# Patient Record
Sex: FEMALE | Race: WHITE
[De-identification: names, ages, dates, MRNs, and addresses within clinical notes are randomized per-mention and may not be internally consistent; named-entity substitution may affect disease eponyms.]

---

## 2017-12-16 ENCOUNTER — HOSPITAL ENCOUNTER (EMERGENCY)
Dept: HOSPITAL 45 - C.EDB | Age: 37
Discharge: HOME | End: 2017-12-16
Payer: COMMERCIAL

## 2017-12-16 VITALS — DIASTOLIC BLOOD PRESSURE: 100 MMHG | SYSTOLIC BLOOD PRESSURE: 149 MMHG | OXYGEN SATURATION: 100 % | HEART RATE: 75 BPM

## 2017-12-16 VITALS
HEIGHT: 62.01 IN | BODY MASS INDEX: 49.55 KG/M2 | BODY MASS INDEX: 49.55 KG/M2 | WEIGHT: 272.71 LBS | WEIGHT: 272.71 LBS | HEIGHT: 62.01 IN

## 2017-12-16 VITALS — TEMPERATURE: 98.24 F

## 2017-12-16 DIAGNOSIS — F32.9: ICD-10-CM

## 2017-12-16 DIAGNOSIS — M54.6: Primary | ICD-10-CM

## 2017-12-16 DIAGNOSIS — I10: ICD-10-CM

## 2017-12-16 DIAGNOSIS — Z79.1: ICD-10-CM

## 2017-12-16 DIAGNOSIS — Z79.891: ICD-10-CM

## 2017-12-16 DIAGNOSIS — E03.9: ICD-10-CM

## 2017-12-16 DIAGNOSIS — M54.5: ICD-10-CM

## 2017-12-16 DIAGNOSIS — K58.9: ICD-10-CM

## 2017-12-16 LAB
ALP SERPL-CCNC: 108 U/L (ref 45–117)
ALT SERPL-CCNC: 24 U/L (ref 12–78)
ANION GAP SERPL CALC-SCNC: 5 MMOL/L (ref 3–11)
APPEARANCE UR: CLEAR
AST SERPL-CCNC: 16 U/L (ref 15–37)
BASOPHILS # BLD: 0.02 K/UL (ref 0–0.2)
BASOPHILS NFR BLD: 0.2 %
BILIRUB UR-MCNC: (no result) MG/DL
BUN SERPL-MCNC: 17 MG/DL (ref 7–18)
BUN/CREAT SERPL: 20.5 (ref 10–20)
CALCIUM SERPL-MCNC: 9.6 MG/DL (ref 8.5–10.1)
CHLORIDE SERPL-SCNC: 103 MMOL/L (ref 98–107)
CO2 SERPL-SCNC: 29 MMOL/L (ref 21–32)
COLOR UR: YELLOW
COMPLETE: YES
CREAT CL PREDICTED SERPL C-G-VRATE: 112.7 ML/MIN
CREAT SERPL-MCNC: 0.85 MG/DL (ref 0.6–1.2)
EOSINOPHIL NFR BLD AUTO: 303 K/UL (ref 130–400)
GLUCOSE SERPL-MCNC: 98 MG/DL (ref 70–99)
HCT VFR BLD CALC: 40.5 % (ref 37–47)
IG%: 0.1 %
IMM GRANULOCYTES NFR BLD AUTO: 28.6 %
LYMPHOCYTES # BLD: 2.77 K/UL (ref 1.2–3.4)
MANUAL MICROSCOPIC REQUIRED?: NO
MCH RBC QN AUTO: 28.8 PG (ref 25–34)
MCHC RBC AUTO-ENTMCNC: 33.6 G/DL (ref 32–36)
MCV RBC AUTO: 85.6 FL (ref 80–100)
MONOCYTES NFR BLD: 4.3 %
NEUTROPHILS # BLD AUTO: 2.8 %
NEUTROPHILS NFR BLD AUTO: 64 %
NITRITE UR QL STRIP: (no result)
PH UR STRIP: 5 [PH] (ref 4.5–7.5)
PMV BLD AUTO: 10.1 FL (ref 7.4–10.4)
POTASSIUM SERPL-SCNC: 3.6 MMOL/L (ref 3.5–5.1)
RBC # BLD AUTO: 4.73 M/UL (ref 4.2–5.4)
REVIEW REQ?: NO
SODIUM SERPL-SCNC: 137 MMOL/L (ref 136–145)
SP GR UR STRIP: 1.03 (ref 1–1.03)
URINE EPITHELIAL CELL AUTO: >30 /LPF (ref 0–5)
UROBILINOGEN UR-MCNC: (no result) MG/DL
WBC # BLD AUTO: 9.67 K/UL (ref 4.8–10.8)
ZZUR CULT IF INDIC CLEAN CATCH: YES

## 2017-12-16 NOTE — DIAGNOSTIC IMAGING REPORT
THORACIC SPINE CT



CT DOSE: 1375.35 mGy.cm



HISTORY:      Lower back pain.



TECHNIQUE: Multiaxial CT images of the thoracic spine were performed and

reformatted in the sagittal and coronal plane without the use of contrast.  A

dose lowering technique was utilized adhering to the principles of ALARA.



COMPARISON:  None.



FINDINGS: No fractures of dictation within the thoracic spine. Mild disc space

narrowing throughout the thoracic spine. Endplate osteophytes within the lower

thoracic spine. Mild right-sided facet degenerative changes within the mid

thoracic spine. No significant central canal or neural foraminal narrowing by CT

technique. Minimal dextroscoliosis of the midthoracic spine. No pneumothorax.

Paraspinal soft tissues are unremarkable.



IMPRESSION:  

No fractures within the thoracic spine.







Electronically signed by:  Mathieu Garcia M.D.

12/16/2017 5:16 PM



Dictated Date/Time:  12/16/2017 5:12 PM

## 2017-12-16 NOTE — EMERGENCY ROOM VISIT NOTE
History


Report prepared by U[place]:  Rene Skaggs


Under the Supervision of:  Dr. Octavio Aceves D.O.


First contact with patient:  16:01


Chief Complaint:  BACK PAIN


Stated Complaint:  SEVERE MID BACK PAIN/LOWER BACK PAIN





History of Present Illness


The patient is a 37 year old female who presents to the Emergency Room with 

complaints of constant mid back pain for the past three weeks which she 

describes as a shooting stabbing pain. She notes that the pain takes her breath 

away. Twisting, turning, bending, and general movement makes the pain worse, 

and the pain wraps around her sides. The patient notes that her last bowel 

movement was yesterday, she is nauseous, and she states that she is able to 

walk. She reports that she has a history of lower back issues, though this is 

different. Pt denies headache, change in vision, fevers, chest pain, nausea, 

vomiting, diarrhea, pain with urination, melena, weakness, numbness, recent 

trauma, problems with her bowels or urination, history of cancer, IV drug use. 

and any recent surgeries. 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





   Source of History:  patient


   Onset:  three weeks ago


   Position:  back (middle)


   Quality:  stabbing


   Timing:  constant


   Modifying Factors (Worsening):  movement, other (twisting, turning, bending)


   Associated Symptoms:  + nausea





Review of Systems


See HPI for pertinent positives & negatives. A total of 10 systems reviewed and 

were otherwise negative.





Past Medical & Surgical


Medical Problems:


(1) Anxiety


(2) Constipation


(3) Depression


(4) Hypertension


(5) Hypothyroidism


(6) IBS (irritable bowel syndrome)








Family History





Patient reports no known family medical history.





Social History


Smoking Status:  Never Smoker


Alcohol Use:  none


Marital Status:  in relationship


Housing Status:  lives with family


Occupation Status:  employed





Current/Historical Medications


Scheduled


Celecoxib (Celecoxib), 200 MG PO BID


Hydrocodone/Acetaminophen 5MG/325MG (Norco 5MG/325MG), 1 TABLET PO HS


Hydrocodone/Acetaminophen 5MG/325MG (Norco 5MG/325MG), 2 TABLETS PO QAM


Levothyroxine Sodium (Levothyroxine Sodium), 200 MCG PO QAM


Lisinopril (Lisinopril), 2.5 MG PO DAILY


Milnacipran Hcl (Savella), 50 MG PO BID


Phentermine Hcl (Phentermine Hcl), 37.5 MG PO DAILY


Pregabalin (Lyrica), 150 MG PO BID


Probiotic Product (Probiotic), 1 CAP PO QAM


Ranitidine HCl (Ranitidine HCl), 150 MG PO BID


Vortioxetine HBr (Trintellix), 10 MG PO DAILY





Scheduled PRN


Oxycodone Immediate Rel Tab (Roxicodone Ir), 5 MG PO Q6H PRN for Pain


Polyethylene (Polyethylene Glycol 3350), 17 GM PO DAILY PRN for Constipation


Prochlorperazine Maleate (Prochlorperazine Maleate), 5 MG PO TID PRN for Nausea





Allergies


Coded Allergies:  


     Sulfa Antibiotics (Verified  Allergy, Intermediate, rash,hives sob, 8/3/16)


     Doxycycline (Verified  Allergy, Unknown, Rash and Hives, 8/3/16)


 Reported by PT


     Meloxicam (Verified  Allergy, Unknown, Rash and Hives, 8/3/16)


 Reported by PT


     Ondansetron (Verified  Allergy, Unknown, Nausea/Vomiting, 8/3/16)


 Reported by PT


     Penicillins (Verified  Allergy, Unknown, Rash,Hives and difficulty 

breathing., 8/3/16)


 Reported by PT





Physical Exam


Vital Signs











  Date Time  Temp Pulse Resp B/P (MAP) Pulse Ox O2 Delivery O2 Flow Rate FiO2


 


12/16/17 18:35  75 18 149/100 100   


 


12/16/17 17:47  80 18 132/90 100 Room Air  


 


12/16/17 15:58 36.8 101 18 157/100 99 Room Air  











Physical Exam


GENERAL: Sitting up in bed, alert, well appearing, well nourished, no distress, 

non-toxic. Holding her lower thoracic region.


EYE EXAM: normal conjunctiva.


OROPHARYNX: no exudate, no erythema, lips, buccal mucosa, and tongue normal and 

mucous membranes are moist


NECK: supple, no nuchal rigidity, no adenopathy, non-tender


LUNGS: Clear to auscultation. Normal chest wall mechanics


HEART: no murmurs, S1 normal and S2 normal 


ABDOMEN: abdomen soft, non-tender, normo-active bowel sounds, no masses, no 

rebound or guarding. 


BACK: Mid and bilateral paraspinal tenderness from the mid thoracic region 

tracking down to the lower thoracic region. Pain worsens with twisting, turning

, and bending.


SKIN: no rashes and no bruising 


UPPER EXTREMITIES: upper extremities are grossly normal. 


LOWER EXTREMITIES: Flexion and extension of the hip, knee, ankle, and EHL are 5/

5 bilaterally. Able to ambulate without difficulty the length of the hallway. 

Patellar and Achilles reflex are 1/4 bilaterally. Gross sensations intact.


NEURO EXAM: Normal sensorium, cranial nerves II-XII grossly intact, normal 

speech,  no gross weakness of arms.





Medical Decision & Procedures


ER Provider


Diagnostic Interpretation:


Radiology results as stated below per my review and the radiologist's 

interpretation: 











THORACIC SPINE CT





CT DOSE: 1375.35 mGy.cm





HISTORY:      Lower back pain.





TECHNIQUE: Multiaxial CT images of the thoracic spine were performed and


reformatted in the sagittal and coronal plane without the use of contrast.  A


dose lowering technique was utilized adhering to the principles of ALARA.





COMPARISON:  None.





FINDINGS: No fractures of dictation within the thoracic spine. Mild disc space


narrowing throughout the thoracic spine. Endplate osteophytes within the lower


thoracic spine. Mild right-sided facet degenerative changes within the mid


thoracic spine. No significant central canal or neural foraminal narrowing by CT


technique. Minimal dextroscoliosis of the midthoracic spine. No pneumothorax.


Paraspinal soft tissues are unremarkable.





IMPRESSION:  


No fractures within the thoracic spine.





Electronically signed by:  Mathieu Garcia M.D.


12/16/2017 5:16 PM





Dictated Date/Time:  12/16/2017 5:12 PM




















LUMBAR SPINE 5 VIEWS





HISTORY:      lower thoracic spine pain 





COMPARISON: Abdomen and pelvis CT 3/12/2016.





FINDINGS: There is no fracture.  No subluxation. Mild disc space narrowing and


small endplate osteophytes throughout the lumbar spine. As also mild facet


degenerative changes within the mid to lower lumbar spine.





IMPRESSION:  


No fracture or subluxation within the lumbar spine. Mild degenerative disc


disease throughout the lumbar spine.





Electronically signed by:  Mathieu Garcia M.D.


12/16/2017 5:35 PM





Dictated Date/Time:  12/16/2017 5:34 PM





Laboratory Results


12/16/17 16:20








Red Blood Count 4.73, Mean Corpuscular Volume 85.6, Mean Corpuscular Hemoglobin 

28.8, Mean Corpuscular Hemoglobin Concent 33.6, Mean Platelet Volume 10.1, 

Neutrophils (%) (Auto) 64.0, Lymphocytes (%) (Auto) 28.6, Monocytes (%) (Auto) 

4.3, Eosinophils (%) (Auto) 2.8, Basophils (%) (Auto) 0.2, Neutrophils # (Auto) 

6.18, Lymphocytes # (Auto) 2.77, Monocytes # (Auto) 0.42, Eosinophils # (Auto) 

0.27, Basophils # (Auto) 0.02





12/16/17 16:20

















Test


  12/16/17


00:00 12/16/17


16:20


 


Urine Color YELLOW  


 


Urine Appearance CLEAR (CLEAR)  


 


Urine pH 5.0 (4.5-7.5)  


 


Urine Specific Gravity


  1.028


(1.000-1.030) 


 


 


Urine Protein NEG (NEG)  


 


Urine Glucose (UA) NEG (NEG)  


 


Urine Ketones NEG (NEG)  


 


Urine Occult Blood NEG (NEG)  


 


Urine Nitrite NEG (NEG)  


 


Urine Bilirubin NEG (NEG)  


 


Urine Urobilinogen NEG (NEG)  


 


Urine Leukocyte Esterase SMALL (NEG)  


 


Urine WBC (Auto)


  10-30 /hpf


(0-5) 


 


 


Urine RBC (Auto)


  5-10 /hpf


(0-4) 


 


 


Urine Hyaline Casts (Auto)


  5-10 /lpf


(0-5) 


 


 


Urine Epithelial Cells (Auto) >30 /lpf (0-5)  


 


Urine Bacteria (Auto) 1+ (NEG)  


 


Urine Pregnancy Test NEG (NEG)  


 


White Blood Count


  


  9.67 K/uL


(4.8-10.8)


 


Red Blood Count


  


  4.73 M/uL


(4.2-5.4)


 


Hemoglobin


  


  13.6 g/dL


(12.0-16.0)


 


Hematocrit  40.5 % (37-47) 


 


Mean Corpuscular Volume


  


  85.6 fL


()


 


Mean Corpuscular Hemoglobin


  


  28.8 pg


(25-34)


 


Mean Corpuscular Hemoglobin


Concent 


  33.6 g/dl


(32-36)


 


Platelet Count


  


  303 K/uL


(130-400)


 


Mean Platelet Volume


  


  10.1 fL


(7.4-10.4)


 


Neutrophils (%) (Auto)  64.0 % 


 


Lymphocytes (%) (Auto)  28.6 % 


 


Monocytes (%) (Auto)  4.3 % 


 


Eosinophils (%) (Auto)  2.8 % 


 


Basophils (%) (Auto)  0.2 % 


 


Neutrophils # (Auto)


  


  6.18 K/uL


(1.4-6.5)


 


Lymphocytes # (Auto)


  


  2.77 K/uL


(1.2-3.4)


 


Monocytes # (Auto)


  


  0.42 K/uL


(0.11-0.59)


 


Eosinophils # (Auto)


  


  0.27 K/uL


(0-0.5)


 


Basophils # (Auto)


  


  0.02 K/uL


(0-0.2)


 


RDW Standard Deviation


  


  46.1 fL


(36.4-46.3)


 


RDW Coefficient of Variation


  


  14.8 %


(11.5-14.5)


 


Immature Granulocyte % (Auto)  0.1 % 


 


Immature Granulocyte # (Auto)


  


  0.01 K/uL


(0.00-0.02)


 


Anion Gap


  


  5.0 mmol/L


(3-11)


 


Est Creatinine Clear Calc


Drug Dose 


  112.7 ml/min 


 


 


Estimated GFR (


American) 


  100.7 


 


 


Estimated GFR (Non-


American 


  86.9 


 


 


BUN/Creatinine Ratio  20.5 (10-20) 


 


Calcium Level


  


  9.6 mg/dl


(8.5-10.1)


 


Total Bilirubin


  


  0.3 mg/dl


(0.2-1)


 


Direct Bilirubin


  


  < 0.1 mg/dl


(0-0.2)


 


Aspartate Amino Transf


(AST/SGOT) 


  16 U/L (15-37) 


 


 


Alanine Aminotransferase


(ALT/SGPT) 


  24 U/L (12-78) 


 


 


Alkaline Phosphatase


  


  108 U/L


()


 


Total Protein


  


  9.0 gm/dl


(6.4-8.2)


 


Albumin


  


  3.9 gm/dl


(3.4-5.0)


 


Lipase


  


  124 U/L


()








Laboratory results per my review.





Medications Administered











 Medications


  (Trade)  Dose


 Ordered  Sig/Bartolome


 Route  Start Time


 Stop Time Status Last Admin


Dose Admin


 


 Sodium Chloride  500 ml @ 


 999 mls/hr  Q31M STAT


 IV  12/16/17 16:11


 12/16/17 16:41 DC 12/16/17 16:29


999 MLS/HR


 


 Ketorolac


 Tromethamine


  (Toradol Inj)  30 mg  NOW  STAT


 IV  12/16/17 16:11


 12/16/17 16:13 DC 12/16/17 16:30


30 MG


 


 Morphine Sulfate


  (MoRPHine


 SULFATE INJ)  4 mg  NOW  STAT


 IV  12/16/17 16:14


 12/16/17 16:15 DC 12/16/17 17:53


4 MG











ED Course


ED COURSE: 


Vital signs were reviewed and showed tachycardia and hypertension


The patients medical record was reviewed


The above diagnostic studies were performed and reviewed.


ED treatments and interventions as stated above. 





1601: The patient was evaluated in room C6. A complete history and physical 

examination was performed.





1611: Toradol 30mg IV, Sodium Chloride 500 ml @ 999 mls/hr IV





1614: Morphine Sulfate 4mg IV





1813: Upon reevaluation, the patient is feeling better.I discussed my findings 

with the patient and she understands and agrees with the treatment plan.   


Based on the patients age, coexisting illnesses, exam and lab findings the 

decision to treat as an outpatient was made.


The patient remained stable while under my care.


The patient appeared well at the time of discharge.





Medical Decision


Differential diagnoses includes but is not limited to lumbar radiculopathy, 

muscle strain, facture, cauda equina, mass, and disc herniation.





Patient is a 37-year-old female who presents to ER for pain in the lower back.  

It is located in the lower thoracic region tracking of the midthoracic.  Pain 

is worse with twisting turning bending.  Patient is completely neurologically 

intact.  No signs cauda equina.  CBC all BMP, LFTs, bilirubin lipase is 

unremarkable.  UA was contaminated with multiple epithelial cells.  Pregnancy 

was negative.  Patient family were updated at bedside.  Patient was given IV 

morphine and Toradol.  She was discharged follow-up with PCP. Discussed with Pt 

concerning signs and symptoms to watch out for. Pt was instructed to follow up 

with their PCP and discussed with the patient their option to return to the ED 

at anytime for persistent or worsening symptoms. The appropriate anticipatory 

guidance and out-patient management, including indications for return to the 

emergency department, were explained at length to the patient and understood.





[place] Drug Monitoring Program


Search Results:  patient reviewed within database, no issues identified





Medication Reconcilliation


Current Medication List:  was personally reviewed by me





Blood Pressure Screening


Patient's blood pressure:  Elevated blood pressure


Blood pressure disposition:  Referred to PCP





Impression





 Primary Impression:  


 Musculoskeletal back pain





Scribe Attestation


The scribe's documentation has been prepared under my direction and personally 

reviewed by me in its entirety. I confirm that the note above accurately 

reflects all work, treatment, procedures, and medical decision making performed 

by me.





Departure Information


Dispostion


Home / Self-Care





Prescriptions





Oxycodone Immediate Rel Tab (ROXICODONE IR) 5 Mg Tab


5 MG PO Q6H Y for Pain, #10 TAB


   Prov: Octavio Aceves, DO         12/16/17





Referrals


LESLIE Gallardo M.D. (MEDICAL) (PCP)





Forms


HOME CARE DOCUMENTATION FORM,                                                 

               IMPORTANT VISIT INFORMATION





Patient Instructions


Back Pain - [facility], My [facility]





Additional Instructions





Please follow up with your primary care doctor with in the next 24 hours.  Any 

worsening of your symptoms, please return to the ED immediately. This includes 

any fevers greater than 100.4, worsening pain, chest pain, shortness breath, 

persistent nausea, vomiting, weakness or numbness in arms or legs, unable to 

urinate, unable to move her bowels, unable to eat or drink, or any other 

concerning signs or symptoms from your standpoint.





Please take Colace 100 mg twice a day in combination with Senokot's 10 mg daily 

if you are taking OxyIR.





You were given medications during this visit that will inhibit your ability to 

drive, operate machinery and work. Please do NOT drive, operate machinery or 

work for the next 12hrs. You were also given a prescription for a narcotic. 

While taking this medication you should also not drive, operate machinery and 

or work.

## 2017-12-16 NOTE — DIAGNOSTIC IMAGING REPORT
LUMBAR SPINE 5 VIEWS



HISTORY:      lower thoracic spine pain 



COMPARISON: Abdomen and pelvis CT 3/12/2016.



FINDINGS: There is no fracture.  No subluxation. Mild disc space narrowing and

small endplate osteophytes throughout the lumbar spine. As also mild facet

degenerative changes within the mid to lower lumbar spine.



IMPRESSION:  

No fracture or subluxation within the lumbar spine. Mild degenerative disc

disease throughout the lumbar spine.







Electronically signed by:  Mathieu Garcia M.D.

12/16/2017 5:35 PM



Dictated Date/Time:  12/16/2017 5:34 PM

## 2018-01-03 ENCOUNTER — HOSPITAL ENCOUNTER (OUTPATIENT)
Dept: HOSPITAL 45 - X.SURG | Age: 38
Discharge: HOME | End: 2018-01-03
Attending: PODIATRIST
Payer: COMMERCIAL

## 2018-01-03 VITALS — SYSTOLIC BLOOD PRESSURE: 118 MMHG | DIASTOLIC BLOOD PRESSURE: 77 MMHG | HEART RATE: 86 BPM | OXYGEN SATURATION: 99 %

## 2018-01-03 VITALS
HEIGHT: 62.01 IN | WEIGHT: 275.58 LBS | WEIGHT: 275.58 LBS | BODY MASS INDEX: 50.07 KG/M2 | HEIGHT: 62.01 IN | BODY MASS INDEX: 50.07 KG/M2

## 2018-01-03 VITALS — TEMPERATURE: 97.7 F

## 2018-01-03 DIAGNOSIS — Z90.89: ICD-10-CM

## 2018-01-03 DIAGNOSIS — E66.9: ICD-10-CM

## 2018-01-03 DIAGNOSIS — Z88.0: ICD-10-CM

## 2018-01-03 DIAGNOSIS — Z82.49: ICD-10-CM

## 2018-01-03 DIAGNOSIS — Z80.3: ICD-10-CM

## 2018-01-03 DIAGNOSIS — I10: ICD-10-CM

## 2018-01-03 DIAGNOSIS — M72.2: Primary | ICD-10-CM

## 2018-01-03 DIAGNOSIS — Z88.2: ICD-10-CM

## 2018-01-03 DIAGNOSIS — E03.9: ICD-10-CM

## 2018-01-03 DIAGNOSIS — Z80.1: ICD-10-CM

## 2018-01-03 DIAGNOSIS — G47.33: ICD-10-CM

## 2018-01-03 DIAGNOSIS — K21.9: ICD-10-CM

## 2018-01-03 NOTE — DISCHARGE INSTRUCTIONS
Discharge Instructions


Date of Service


Jani 3, 2018.





Visit


Reason for Visit:  Bilateral Feet Plantar Fasciitis





Discharge


Discharge Diagnosis / Problem:  bilateral plantar fasciitis





Discharge Goals


Goal(s):  Improve function





Medications


Stopped Medications Name(s):  


celebrex stopped 6 days ago





Activity Recommendations


Activity Limitations:  resume your previous activity


Lifting Limitations:  none


Exercise/Sports Limitations:  none


Shower/Bathe:  keep incision dry


Weightbearing Status:  Left weightbearing (as tolerated), Right weightbearing (

as tolerated)


No driving until fu in office





Anesthesia


.





Post Anesthesia Instructions:





If you have had General Anesthesia or IV Sedation:





*  Do not drive today.


*  Resume driving when surgeon permits.


*  Do not make important decisions or sign legal documents today.


*  Call surgeon for:





   1.  Temperature elevations greater than 101 degrees F.


   2.  Uncontrollable pain.


   3.  Excessive bleeding.


   4.  Persistent nausea and vomiting.


   5.  Medication intolerance (nausea, vomiting or rash).





*  For nausea and vomiting use only clear liquids such as: tea, soda, bouillon 

until nausea subsides, then gradually increase diet as tolerated.





*  If you have any concerns or questions, call your surgeon's office.  If 

physician is unavailable and it is an emergency, call 911 or go to the nearest 

emergency room.





.





Diet Recommendations


Recommended Home Diet:  no limitations, resume previous diet





Procedures


Procedures Performed:  


Bilateral Feet Planter Fasciectomy





Pending Studies


Studies pending at discharge:  no





Medical Emergencies








.


Who to Call and When:





Medical Emergencies:  If at any time you feel your situation is an emergency, 

please call 911 immediately.





.





Non-Emergent Contact


Non-Emergency issues call your:  Surgeon


Call Non-Emergent contact if:  you have a fever, temperature is above 101.5, 

your pain is not controlled, wound has increased drainage, wound has increased 

redness, wound has increased pain





.





Past History


Medical & Surgical History:  


(1) Kidney stone


(2) Renal colic


(3) Nausea vomiting and diarrhea


(4) Musculoskeletal back pain


(5) Hypertension


(6) Hypothyroidism


(7) IBS (irritable bowel syndrome)


(8) Constipation


(9) Depression


(10) Anxiety


.








"Provider Documentation" section prepared by Andres Paredes.








.

## 2018-01-03 NOTE — [facility] POST OPERATIVE BRIEF NOTE
Immediate Operative Summary


Operative Date


Jani 3, 2018.





Pre-Operative Diagnosis





Bilateral Plantar Fasciitis





Post-Operative Diagnosis





Same





Procedure(s) Performed





Bilateral Feet Planter Fasciectomy





Surgeon


Dr. Paredes





Assistant Surgeon(s)


None





Estimated Blood Loss


10ML





Findings


consistent w preop dx





Specimens





None





Anesthesia


local with MAC





Complication(s)


None





Disposition


Recovery Room / PACU


VSS and NVSI to b/l LE

## 2018-01-03 NOTE — [facility] OPERATIVE REPORT
Operative Report


Operative Date


Jani 3, 2018.





Pre-Operative Diagnosis





Bilateral Plantar Fasciitis





Post-Operative Diagnosis





Same





Procedure(s) Performed





Bilateral plantar fascia release





Surgeon


Dr. Paredes





Assistant Surgeon(s)


None





Estimated Blood Loss


10ML





Findings


consistent w/ preoperative dx





Specimens





None





Anesthesia


MAC w/ local field block





Complication(s)


None





Disposition


Recovery Room / PACU





Description of Procedure


Patient has failed outpatinet conservative therapy for the bilateral plantar 

fasciitis by one of my partners. Please see last clinic note for full HPI. All 

RBCA were explained and she elects to proceed. 





Patient was taken from the preoperative holding area to the operating room and 

placed on the table in the normal supine position. The bilateral feet and 

ankles were then identified. After induction of MAC anesthesia a local field 

block was performed to the bilateral medial calcaneal tubercle. 2% lidocaine 

with epinephrine was used to total 20 cc were used, 10 cc in each foot. At this 

time the bilateral lower extremities were prepped and draped in the normal 

sterile flap fashion using ChloraPrep. Attention was directed to the right 

medial calcaneal tubercle where an incision was made and drawn over the medial 

calcaneal tubercle. Dissection was carried down to the level of the medial 

calcaneal tubercle which is identified. A hemostat and periosteal elevator were 

used to free up the medial band of plantar fascial tissue. After identification 

band was incised with a #15 blade. Care was taken to avoid all neurovascular 

structures. Adequate release was noted in check with dorsiflexion of the 

hallux.  The incision site was then flushed with copious amounts of normal 

sterile saline. Site was closed with 4-0 nylon.





Attention was directed to the left medial calcaneal tubercle where an incision 

was made and drawn over the medial calcaneal tubercle. Dissection was carried 

down to the level of the medial calcaneal tubercle which is identified. A 

hemostat and periosteal elevator were used to free up the medial band of 

plantar fascial tissue. After identification band was incised with a #15 blade. 

Care was taken to avoid all neurovascular structures. Adequate release was 

noted in check with dorsiflexion of the hallux. The incision site was then 

flushed with copious amounts of normal sterile saline. Site was closed with 4-0 

nylon.





Patient tolerated the anesthesia and procedure well. Patient left the operating 

room with vital signs stable and neurovascular status intact to the bilateral 

lower extremity.


I attest to the content of the Intraoperative Record and any orders documented 

therein.  Any exceptions are noted below.

## 2018-01-03 NOTE — ANESTHESIA PROGRESS NT - [facility]
Anesthesia Post Op Note


Date & Time


Jani 3, 2018 at 10:55





Vital Signs


Pain Intensity:  0





Vital Signs Past 12 Hours








  Date Time  Temp Pulse Resp B/P (MAP) Pulse Ox O2 Delivery O2 Flow Rate FiO2


 


1/3/18 10:03 36.5 78 16 111/75 (87) 98 Room Air  


 


1/3/18 08:00 36.8 87 16  99 Room Air  











Notes


Mental Status:  alert / awake / arousable, participated in evaluation


Pt Amnestic to Procedure:  Yes


Nausea / Vomiting:  adequately controlled


Pain:  adequately controlled


Airway Patency, RR, SpO2:  stable & adequate


BP & HR:  stable & adequate


Hydration State:  stable & adequate


Anesthetic Complications:  no major complications apparent

## 2018-03-07 ENCOUNTER — HOSPITAL ENCOUNTER (INPATIENT)
Dept: HOSPITAL 45 - C.EDB | Age: 38
LOS: 1 days | Discharge: HOME | DRG: 871 | End: 2018-03-08
Attending: HOSPITALIST | Admitting: HOSPITALIST
Payer: COMMERCIAL

## 2018-03-07 ENCOUNTER — HOSPITAL ENCOUNTER (OUTPATIENT)
Dept: HOSPITAL 45 - C.GI | Age: 38
Discharge: HOME | End: 2018-03-07
Attending: INTERNAL MEDICINE
Payer: COMMERCIAL

## 2018-03-07 VITALS
HEART RATE: 98 BPM | DIASTOLIC BLOOD PRESSURE: 81 MMHG | SYSTOLIC BLOOD PRESSURE: 129 MMHG | OXYGEN SATURATION: 95 % | TEMPERATURE: 98.24 F

## 2018-03-07 VITALS
OXYGEN SATURATION: 93 % | SYSTOLIC BLOOD PRESSURE: 122 MMHG | HEART RATE: 112 BPM | TEMPERATURE: 98.42 F | DIASTOLIC BLOOD PRESSURE: 79 MMHG

## 2018-03-07 VITALS
BODY MASS INDEX: 50.23 KG/M2 | WEIGHT: 276.46 LBS | BODY MASS INDEX: 50.23 KG/M2 | HEIGHT: 62.01 IN | WEIGHT: 276.46 LBS | HEIGHT: 62.01 IN

## 2018-03-07 VITALS — DIASTOLIC BLOOD PRESSURE: 85 MMHG | SYSTOLIC BLOOD PRESSURE: 122 MMHG | OXYGEN SATURATION: 96 %

## 2018-03-07 VITALS — OXYGEN SATURATION: 98 % | HEART RATE: 84 BPM

## 2018-03-07 VITALS
BODY MASS INDEX: 50.44 KG/M2 | BODY MASS INDEX: 50.44 KG/M2 | BODY MASS INDEX: 50.44 KG/M2 | HEIGHT: 62.01 IN | WEIGHT: 277.58 LBS | WEIGHT: 277.58 LBS | HEIGHT: 62.01 IN

## 2018-03-07 DIAGNOSIS — J69.0: ICD-10-CM

## 2018-03-07 DIAGNOSIS — Z88.0: ICD-10-CM

## 2018-03-07 DIAGNOSIS — Z88.1: ICD-10-CM

## 2018-03-07 DIAGNOSIS — Z88.2: ICD-10-CM
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
## 2018-03-07 DIAGNOSIS — Z88.6: ICD-10-CM

## 2018-03-07 DIAGNOSIS — E66.01: ICD-10-CM

## 2018-03-07 DIAGNOSIS — Z98.890: ICD-10-CM

## 2018-03-07 DIAGNOSIS — K21.0: ICD-10-CM

## 2018-03-07 DIAGNOSIS — K58.1: ICD-10-CM

## 2018-03-07 DIAGNOSIS — R76.0: ICD-10-CM

## 2018-03-07 DIAGNOSIS — I10: ICD-10-CM

## 2018-03-07 DIAGNOSIS — F41.8: ICD-10-CM

## 2018-03-07 DIAGNOSIS — K22.70: ICD-10-CM

## 2018-03-07 DIAGNOSIS — F41.9: ICD-10-CM

## 2018-03-07 DIAGNOSIS — G47.33: ICD-10-CM

## 2018-03-07 DIAGNOSIS — Z83.3: ICD-10-CM

## 2018-03-07 DIAGNOSIS — A41.9: Primary | ICD-10-CM

## 2018-03-07 DIAGNOSIS — L27.1: ICD-10-CM

## 2018-03-07 DIAGNOSIS — T36.8X5A: ICD-10-CM

## 2018-03-07 DIAGNOSIS — F32.9: ICD-10-CM

## 2018-03-07 DIAGNOSIS — R10.9: Primary | ICD-10-CM

## 2018-03-07 DIAGNOSIS — E03.9: ICD-10-CM

## 2018-03-07 DIAGNOSIS — K29.50: ICD-10-CM

## 2018-03-07 DIAGNOSIS — M79.7: ICD-10-CM

## 2018-03-07 DIAGNOSIS — Z88.8: ICD-10-CM

## 2018-03-07 LAB
ALBUMIN SERPL-MCNC: 3.4 GM/DL (ref 3.4–5)
ALP SERPL-CCNC: 93 U/L (ref 45–117)
ALT SERPL-CCNC: 28 U/L (ref 12–78)
AST SERPL-CCNC: 17 U/L (ref 15–37)
BASOPHILS # BLD: 0.02 K/UL (ref 0–0.2)
BASOPHILS NFR BLD: 0.2 %
BUN SERPL-MCNC: 15 MG/DL (ref 7–18)
CALCIUM SERPL-MCNC: 9.1 MG/DL (ref 8.5–10.1)
CK MB SERPL-MCNC: < 0.5 NG/ML (ref 0.5–3.6)
CO2 SERPL-SCNC: 23 MMOL/L (ref 21–32)
CREAT SERPL-MCNC: 1 MG/DL (ref 0.6–1.2)
EOS ABS #: 0.01 K/UL (ref 0–0.5)
EOSINOPHIL NFR BLD AUTO: 257 K/UL (ref 130–400)
FLUAV RNA SPEC QL NAA+PROBE: (no result)
FLUBV RNA SPEC QL NAA+PROBE: (no result)
GLUCOSE SERPL-MCNC: 202 MG/DL (ref 70–99)
HCT VFR BLD CALC: 38.8 % (ref 37–47)
HGB BLD-MCNC: 13 G/DL (ref 12–16)
IG#: 0.02 K/UL (ref 0–0.02)
IMM GRANULOCYTES NFR BLD AUTO: 5.7 %
INR PPP: 1 (ref 0.9–1.1)
LIPASE: 105 U/L (ref 73–393)
LYMPHOCYTES # BLD: 0.75 K/UL (ref 1.2–3.4)
MCH RBC QN AUTO: 28.7 PG (ref 25–34)
MCHC RBC AUTO-ENTMCNC: 33.5 G/DL (ref 32–36)
MCV RBC AUTO: 85.7 FL (ref 80–100)
MONO ABS #: 0.38 K/UL (ref 0.11–0.59)
MONOCYTES NFR BLD: 2.9 %
NEUT ABS #: 11.88 K/UL (ref 1.4–6.5)
NEUTROPHILS # BLD AUTO: 0.1 %
NEUTROPHILS NFR BLD AUTO: 90.9 %
PMV BLD AUTO: 10.1 FL (ref 7.4–10.4)
POTASSIUM SERPL-SCNC: 4.1 MMOL/L (ref 3.5–5.1)
PROT SERPL-MCNC: 8.1 GM/DL (ref 6.4–8.2)
RED CELL DISTRIBUTION WIDTH CV: 15.2 % (ref 11.5–14.5)
RED CELL DISTRIBUTION WIDTH SD: 47.6 FL (ref 36.4–46.3)
SODIUM SERPL-SCNC: 136 MMOL/L (ref 136–145)
WBC # BLD AUTO: 13.06 K/UL (ref 4.8–10.8)

## 2018-03-07 RX ADMIN — RANITIDINE SCH MG: 150 TABLET ORAL at 23:49

## 2018-03-07 RX ADMIN — PREGABALIN SCH MG: 150 CAPSULE ORAL at 23:49

## 2018-03-07 NOTE — DISCHARGE INSTRUCTIONS
Endoscopy Patient Instructions


Date / Procedure(s) Performed


Mar 7, 2018.


EGD





Allergy Information


Coded Allergies:  


     Sulfa Antibiotics (Verified  Allergy, Intermediate, rash,hives sob, 3/2/18)


     Doxycycline (Verified  Allergy, Unknown, Rash and Hives, 3/2/18)


 Reported by PT


     Meloxicam (Verified  Allergy, Unknown, Rash and Hives, 3/2/18)


 Reported by PT


     Ondansetron (Verified  Allergy, Unknown, Nausea/Vomiting, 3/2/18)


 Reported by PT


     Penicillins (Verified  Allergy, Unknown, Rash,Hives and difficulty 

breathing., 3/2/18)


 Reported by PT





Discharge Date / Findings


Mar 7, 2018.


Mild reflux esophagitis.  Biopsies taken from duodenum and stomach.





Medication Instructions


Stopped Medication(s):  


STOPPED MOST MEDS FOR TODAYS PROCEDURE BY 1 DAY


Restart Stopped Medication(s):


Resume all stopped meds.





Provider Instructions





Activity Restrictions





-  No exercising or heavy lifting for 24 hours. 


-  Do not drink alcohol the day of the procedure.


-  Do not drive a car or operate machinery until the day after the procedure.


-  Do not make any important decisions or sign important papers in 24 hours 

after the procedure.





Following Day:





-  Return to full activity which may include returning to work/school.





Diet





Start your diet with liquids and light foods (jello, soup, juice, toast).  Then 

eat your usual diet if not nauseated.





Treatment For Common After Affects





For mild abdominal pain, bloating, or excessive gas:





-  Rest


-  Eat lightly


-  Lie on right side





Follow-Up Information


Follow-up with DR MARC TALAMANTES as scheduled





Anesthesia Information





What You Should Know





You have had a procedure that required some medicine to reduce anxiety and 

discomfort. This treatment is called moderate sedation.  


After receiving the treatment, you may be sleepy, but you will be able to 

breathe on your own.  The effects of the treatment may last for several hours.








Follow these instructions along with Activity/Diet recommendations noted above:





*  Do NOT do anything where dizziness or clumsiness would be dangerous.





*  Rest quietly at home today, then you can be up and about tomorrow.





*  Have a responsible person stay with you the rest of today.





*  You may have had an I.V. today.  If so, you may take the dressing off later 

today.





Recommendations


 


Call your doctor if:





*  Trouble breathing 





*  Continuous vomiting for more than 24 hours








*  Temperature above 101 degrees





*  Severe abdominal pain or bloating





*  Pain not relieved by pain medicine ordered





*  There is increased drainage or redness from any incision





*  A large amount of rectal bleeding greater than 2-3 tablespoons. 


   (If you had a polyp/s removed or have hemorrhoids, a small amount of blood -


    from the rectum is to be expected.)





*  You have any unanswered questions or concerns.








IN THE EVENT OF A SERIOUS EMERGENCY, GO TO THE NEAREST EMERGENCY ROOM








       Your discharge instructions were prepared by provider Janelle Tavarez.





 Patient Instructions Signature Page














Ailyn Arellano 











Patient (or Guardian) Signature/Date:____________________________________ I 

have read and understand the instructions given to me by my caregivers.








Caregiver/RN/Doctor Signature/Date:____________________________________











The above-named patient and/or guardian has received patient instructions on 

this date.





























+  Original Patient Signature Page (only) stays with chart.  Please make copy 

for patient.

## 2018-03-07 NOTE — DIAGNOSTIC IMAGING REPORT
SINGLE VIEW CHEST



CLINICAL HISTORY:  Atypical chest pain.



FINDINGS: An AP, portable, upright chest radiograph is obtained. No prior

studies are available for comparison at the time of dictation. The examination

is degraded by portable technique and patient rotation.  The cardiomediastinal

silhouette is unremarkable. The lungs and pleural spaces are clear. No

pneumothorax is seen. The bony thorax is grossly intact.



IMPRESSION: No active disease in the chest. 







Electronically signed by:  Sahil Aranda M.D.

3/7/2018 6:19 PM



Dictated Date/Time:  3/7/2018 6:19 PM

## 2018-03-07 NOTE — ENDO HISTORY AND PHYSICAL
History & Physical


Date of Service:


Mar 7, 2018.


Chief Complaint:


NAUSEA WITHOUT VOMITING, PAIN IN UPPER ABDOMEN


Referring Physician:


DR MARC TALAMANTES


History of Present Illness


n/v





Past Surgical History


Hx Cardiac Surgery:  No


Hx Internal Defibrillator:  No


Hx Pacemaker:  No


Hx Abdominal Surgery:  No


Hx of Implantable Prosthesis:  No


Hx Post-Op Nausea and Vomiting:  Yes


Hx Cancer Surgery:  No


Hx Thoracic Surgery:  No


Hx Orthopedic:  Yes (RIGHT KNEE ARTHROSCOPY, RT/LEFT FOOT FASCITIS)


Hx Urinary Tract Surgery:  No





Family History


None





Social History


Smoking Status:  Never Smoker


Hx Substance Use:  No


Hx Alcohol Use:  Yes (OCCASIONALLY)





Allergies


Coded Allergies:  


     Sulfa Antibiotics (Verified  Allergy, Intermediate, rash,hives sob, 3/2/18)


     Doxycycline (Verified  Allergy, Unknown, Rash and Hives, 3/2/18)


 Reported by PT


     Meloxicam (Verified  Allergy, Unknown, Rash and Hives, 3/2/18)


 Reported by PT


     Ondansetron (Verified  Allergy, Unknown, Nausea/Vomiting, 3/2/18)


 Reported by PT


     Penicillins (Verified  Allergy, Unknown, Rash,Hives and difficulty 

breathing., 3/2/18)


 Reported by PT





Current Medications





Reported Home Medications








 Medications  Dose


 Route/Sig


 Max Daily Dose Days Date Category Dose


Instructions


 


 Trintellix


  (Vortioxetine


 HBr) 10 Mg Tab  2 Tab


 PO QAM


    3/2/18 Reported 


 


 Voltaren


  (Diclofenac


 Sodium) 50 Mg


 Tabec  50 Mg


 PO BID


    3/2/18 Reported 


 


 Norco 5MG/325MG


  (Acetaminophen/Hydrocodone


 Bitart)  Tab  2 Tablets


 PO QAM


    12/16/17 Reported 


 


 Norco 5MG/325MG


  (Acetaminophen/Hydrocodone


 Bitart)  Tab  1 Tablet


 PO HS


    12/16/17 Reported 


 


 Savella


  (Milnacipran Hcl)


 50 Mg Tab  50 Mg


 PO BID


    12/16/17 Reported 


 


 Lyrica


  (Pregabalin) 150


 Mg Cap  150 Mg


 PO BID


    12/16/17 Reported 


 


 Prochlorperazine


 Maleate 5 Mg Tab  5 Mg


 PO TID PRN


    12/16/17 Reported 


 


 Lisinopril 2.5 Mg


 Tab  2.5 Mg


 PO QAM


    12/16/17 Reported 


 


 Levothyroxine


 Sodium 200 Mcg Tab  200 Mcg


 PO QAM


    3/12/16 Reported 


 


 Probiotic


  (Probiotic


 Product) 1 Cap Cap  1 Cap


 PO QAM


    8/26/15 Reported 


 


 Polyethylene


 Glycol 3350


  (Polyethylene)


 527 Gm Soln  17 Gm


 PO DAILY PRN


    8/26/15 Reported  DISSOLVE IN WATER.


 


 Ranitidine HCl


 150 Mg Tab  150 Mg


 PO BID


    8/26/15 Reported 











Vital Signs


Weight (Kilograms):  125.91


Height (Feet):  5


Height (Inches):  2











  Date Time  Temp Pulse Resp B/P (MAP) Pulse Ox O2 Delivery O2 Flow Rate FiO2


 


3/7/18 10:14    149/91 (110)    


 


3/7/18 10:06 36.7 95 16 159/115 (130) 96 Room Air  











Physical Exam


General Appearance:  no apparent distress


Respiratory/Chest:  


   Auscultation:  breath sounds normal


Abdomen:  


   Bowel Sounds:  normal





Assessment and Plan


N/V - EGD

## 2018-03-07 NOTE — GI REPORT
Procedure Date: 3/7/2018 11:03 AM

Procedure:            Upper GI endoscopy

Indications:          Abdominal pain

Medicines:            See the Anesthesia note for documentation of the 

                      administered medications

Complications:        No immediate complications.

Estimated Blood Loss: Estimated blood loss: none.

Procedure:            Pre-Anesthesia Assessment:

                      - ASA Grade Assessment: III - A patient with severe 

                      systemic disease.

                      After obtaining informed consent, the endoscope was 

                      passed under direct vision. Throughout the procedure, 

                      the patient's blood pressure, pulse, and oxygen 

                      saturations were monitored continuously. The On-site 

                      loaner was introduced through the mouth, and advanced 

                      to the second part of duodenum. The upper GI endoscopy 

                      was accomplished without difficulty. The patient 

                      tolerated the procedure.

Findings:

     LA Grade A (one or more mucosal breaks less than 5 mm, not extending 

     between tops of 2 mucosal folds) esophagitis with no bleeding was found.

     The exam of the esophagus was otherwise normal.

     The entire examined stomach was normal. Biopsies were taken with a cold 

     forceps for histology.

     The examined duodenum was normal. Biopsies were taken with a cold 

     forceps for histology.

Impression:           - LA Grade A reflux esophagitis.

                      - Normal stomach. Biopsied.

                      - Normal examined duodenum. Biopsied.

Recommendation:       - Discharge patient to home.

Janelle Nash M.D. 

Jnaelle Nash MD

3/7/2018 12:22:07 PM

This report has been signed electronically.

Note Initiated On: 3/7/2018 11:03 AM

     I attest to the content of the Intraoperative Record and orders 

     documented therein, exceptions below

## 2018-03-07 NOTE — HISTORY AND PHYSICAL
History & Physical


Date & Time of Service:


Mar 7, 2018 at 21:35


Chief Complaint:


Pio Pain, Coughing, Fever


Primary Care Physician:


LESLIE Gallardo M.D. (MEDICAL)


History of Present Illness


Source:  patient, clinic records, hospital records


The patient is a 38-year-old female who presents to the emergency room via EMS 

with complaints of constant chest pain and abdominal pain beginning earlier 

today following an upper endoscopy.  The patient states that after the 

procedure she began to cough uncontrollably and was unable to speak.  She was 

given a DuoNeb treatment with inhalers and sent home.  When she got home she 

continued to cough and developed chest pain trouble breathing and abdominal 

pain.  She describes her chest pain as a soreness.  She reports vomiting one 

time prior to arrival to the ER.  Vomitus was described as mucousy without 

blood.  She has no history of asthma or COPD.  The endoscopy was performed for 

chronic bloating and abdominal pain.  She reports her abdominal pain and chest 

pain are improved since the breathing treatment.  She does report a headache 

likely secondary to the excessive coughing.  The coughing and difficulty 

breathing have improved.  She is not in respiratory distress at this time.  She 

does report fevers and chills that began shortly after arriving home today.  

She reports that when she got home her shortness of breath became worse and her 

chest pain felt tighter wrapping around her shoulder blades.  She denies a 

history of blood clot but does report a history of multiple spontaneous 

abortions.  Review of system reveals chronic constipation.  She denies diarrhea 

or blood per rectum.  She reports irregular menstruation with last menstrual 

period in January 2018.  She denies any vaginal discharge or urinary tract 

infection symptoms such as dysuria urgency or flank pain.  Prior to the 

procedure she was in her normal state of health.





Past Medical/Surgical History


Medical Problems:


(1) Anxiety


Status: Chronic  





(2) Farley esophagus


Status: Chronic  





(3) Constipation


Status: Chronic  





(4) Depression


Status: Chronic  





(5) Diastolic dysfunction


Status: Chronic  





(6) Fibromyalgia


Status: Chronic  





(7) HTN (hypertension)


Status: Chronic  





(8) Hyperhidrosis


Status: Chronic  





(9) Hypertension


Status: Chronic  





(10) Hypothyroidism


Status: Chronic  





(11) IBS (irritable bowel syndrome)


Status: Chronic  





(12) Low back pain


Status: Chronic  





(13) Lupus


Status: Chronic  





(14) Menometrorrhagia


Status: Chronic  





(15) ZION (obstructive sleep apnea)


Permanent Comment: intolerant of CPAP mask


Status: Chronic  





(16) PCOS (polycystic ovarian syndrome)


Status: Chronic  





(17) Plantar fascial fibromatosis of both feet


Status: Chronic  





(18) Ureteral calculus


Status: Chronic  





Surgical Problems:


(1) S/P bilateral breast reduction


Status: Chronic  





(2) S/P D&C (status post dilation and curettage)


Status: Chronic  





(3) S/P T&A (status post tonsillectomy and adenoidectomy)


Status: Chronic  





(4) Status post third molar tooth extraction


Status: Chronic  








Family History





Cancer


Diabetes mellitus


FH: cancer


FH: lung disease


Heart disease


Hypertension





Social History


Smoking Status:  Never Smoker


Smokeless Tobacco Use:  No


Alcohol Use:  socially


Drug Use:  none


Marital Status:  , in relationship


Housing status:  lives with significant other


Occupational Status:  unemployed





Immunizations


History of Influenza Vaccine:  Unknown


History of Tetanus Vaccine?:  Yes


Tetanus Immunization Date:  Oct 30, 2009


History of Pneumococcal:  Unknown


History of Hepatitis B Vaccine:  Unknown





Allergies


Coded Allergies:  


     Sulfa Antibiotics (Verified  Allergy, Intermediate, rash,hives sob, 3/2/18)


     Vancomycin (Verified  Allergy, Intermediate, rash, 3/8/18)


     Doxycycline (Verified  Allergy, Unknown, Rash and Hives, 3/2/18)


 Reported by PT


     Meloxicam (Verified  Allergy, Unknown, Rash and Hives, 3/2/18)


 Reported by PT


     Ondansetron (Verified  Allergy, Unknown, Nausea/Vomiting, 3/2/18)


 Reported by PT


     Penicillins (Verified  Allergy, Unknown, Rash,Hives and difficulty 

breathing., 3/2/18)


 Reported by PT





Home Medications


Scheduled


Hydrocodone/Acetaminophen 5MG/325MG (Norco 5MG/325MG), 1 TABLET PO HS


Hydrocodone/Acetaminophen 5MG/325MG (Norco 5MG/325MG), 2 TABLETS PO QAM


Levothyroxine Sodium (Levothyroxine Sodium), 200 MCG PO QAM


Lisinopril (Lisinopril), 2.5 MG PO QAM


Milnacipran Hcl (Savella), 50 MG PO BID


Pregabalin (Lyrica), 150 MG PO BID


Probiotic Product (Probiotic), 1 CAP PO QAM


Ranitidine HCl (Ranitidine HCl), 150 MG PO BID


Vortioxetine HBr (Trintellix), 2 TAB PO QAM





Scheduled PRN


Alprazolam (Xanax), 1 TAB PO TID PRN for Anxiety/Agitation


Polyethylene (Polyethylene Glycol 3350), 17 GM PO DAILY PRN for Constipation


Prochlorperazine Maleate (Prochlorperazine Maleate), 5 MG PO TID PRN for Nausea





Review of Systems


At least ten systems were reviewed and negative except as indicated in HPI.





Physical Exam


Vital Signs











  Date Time  Temp Pulse Resp B/P (MAP) Pulse Ox O2 Delivery O2 Flow Rate FiO2


 


3/7/18 19:33 37.9 130 18 134/84 94 Room Air  


 


3/7/18 18:56 37.7 123 18 138/85 95 Room Air  


 


3/7/18 18:11  139      


 


3/7/18 17:56      Room Air  


 


3/7/18 17:56 38.1 140 22 156/100 98 Room Air  








General Appearance:  no apparent distress, + obese, + pertinent finding (

diaphoretic and flushed, no rash)


Head:  normocephalic, atraumatic


Eyes:  normal inspection, PERRL, sclerae normal


ENT:  normal ENT inspection, pharynx normal, + pertinent finding (cerumen 

bilaterally prevented visualization of TMs. )


Neck:  supple, no adenopathy, trachea midline, + pertinent finding (no sinus TTP

)


Respiratory/Chest:  chest non-tender, lungs clear, normal breath sounds, no 

respiratory distress, no accessory muscle use


Cardiovascular:  no edema, no gallop, no JVD, no murmur, normal peripheral 

pulses, + tachycardia


Abdomen/GI:  normal bowel sounds, soft, + tenderness (epigastric region)


Back:  normal inspection, no CVA tenderness


Extremities/Musculoskelatal:  normal inspection, no calf tenderness, no pedal 

edema, normal range of motion


Neurologic/Psych:  CNs II-XII nml as tested, no motor/sensory deficits, alert, 

normal mood/affect, oriented x 3


Skin:  normal color, warm/dry, no rash





Diagnostics


Laboratory Results


3/7/18 18:11








Red Blood Count 4.53, Mean Corpuscular Volume 85.7, Mean Corpuscular Hemoglobin 

28.7, Mean Corpuscular Hemoglobin Concent 33.5, Mean Platelet Volume 10.1, 

Neutrophils (%) (Auto) 90.9, Lymphocytes (%) (Auto) 5.7, Monocytes (%) (Auto) 

2.9, Eosinophils (%) (Auto) 0.1, Basophils (%) (Auto) 0.2, Neutrophils # (Auto) 

11.88, Lymphocytes # (Auto) 0.75, Monocytes # (Auto) 0.38, Eosinophils # (Auto) 

0.01, Basophils # (Auto) 0.02





3/7/18 18:11

















Test


  3/7/18


18:11 3/7/18


20:45 3/7/18


21:16


 


White Blood Count


  13.06 K/uL


(4.8-10.8) 


  


 


 


Red Blood Count


  4.53 M/uL


(4.2-5.4) 


  


 


 


Hemoglobin


  13.0 g/dL


(12.0-16.0) 


  


 


 


Hematocrit 38.8 % (37-47)   


 


Mean Corpuscular Volume


  85.7 fL


() 


  


 


 


Mean Corpuscular Hemoglobin


  28.7 pg


(25-34) 


  


 


 


Mean Corpuscular Hemoglobin


Concent 33.5 g/dl


(32-36) 


  


 


 


Platelet Count


  257 K/uL


(130-400) 


  


 


 


Mean Platelet Volume


  10.1 fL


(7.4-10.4) 


  


 


 


Neutrophils (%) (Auto) 90.9 %   


 


Lymphocytes (%) (Auto) 5.7 %   


 


Monocytes (%) (Auto) 2.9 %   


 


Eosinophils (%) (Auto) 0.1 %   


 


Basophils (%) (Auto) 0.2 %   


 


Neutrophils # (Auto)


  11.88 K/uL


(1.4-6.5) 


  


 


 


Lymphocytes # (Auto)


  0.75 K/uL


(1.2-3.4) 


  


 


 


Monocytes # (Auto)


  0.38 K/uL


(0.11-0.59) 


  


 


 


Eosinophils # (Auto)


  0.01 K/uL


(0-0.5) 


  


 


 


Basophils # (Auto)


  0.02 K/uL


(0-0.2) 


  


 


 


RDW Standard Deviation


  47.6 fL


(36.4-46.3) 


  


 


 


RDW Coefficient of Variation


  15.2 %


(11.5-14.5) 


  


 


 


Immature Granulocyte % (Auto) 0.2 %   


 


Immature Granulocyte # (Auto)


  0.02 K/uL


(0.00-0.02) 


  


 


 


Anion Gap


  8.0 mmol/L


(3-11) 


  


 


 


Est Creatinine Clear Calc


Drug Dose 95.5 ml/min 


  


  


 


 


Estimated GFR (


American) 82.2 


  


  


 


 


Estimated GFR (Non-


American 70.9 


  


  


 


 


BUN/Creatinine Ratio 15.2 (10-20)   


 


Calcium Level


  9.1 mg/dl


(8.5-10.1) 


  


 


 


Total Bilirubin


  0.3 mg/dl


(0.2-1) 


  


 


 


Direct Bilirubin


  < 0.1 mg/dl


(0-0.2) 


  


 


 


Aspartate Amino Transf


(AST/SGOT) 17 U/L (15-37) 


  


  


 


 


Alanine Aminotransferase


(ALT/SGPT) 28 U/L (12-78) 


  


  


 


 


Alkaline Phosphatase


  93 U/L


() 


  


 


 


Total Creatine Kinase


  58 U/L


() 


  


 


 


Creatine Kinase MB


  < 0.5 ng/ml


(0.5-3.6) 


  


 


 


Creatine Kinase MB Ratio  (0-3.0)   


 


Total Protein


  8.1 gm/dl


(6.4-8.2) 


  


 


 


Albumin


  3.4 gm/dl


(3.4-5.0) 


  


 


 


Lipase


  105 U/L


() 


  


 


 


Urine Pregnancy Test  NEG (NEG)  


 


Prothrombin Time


  


  


  10.1 SECONDS


(9.0-12.0)


 


Prothromb Time International


Ratio 


  


  1.0 (0.9-1.1) 


 














 Date/Time


Source Procedure


Growth Status


 


 


 3/7/18 21:25


Blood Blood Culture


Pending Received


 


 3/7/18 20:45


Urine , Clean Catch Urine Culture


Pending Received








Results Past 24 Hours








Test


  3/7/18


18:11 3/7/18


20:45 3/7/18


21:16 Range/Units


 


 


White Blood Count 13.06   4.8-10.8  K/uL


 


Red Blood Count 4.53   4.2-5.4  M/uL


 


Hemoglobin 13.0   12.0-16.0  g/dL


 


Hematocrit 38.8   37-47  %


 


Mean Corpuscular Volume 85.7     fL


 


Mean Corpuscular Hemoglobin 28.7   25-34  pg


 


Mean Corpuscular Hemoglobin


Concent 33.5


  


  


  32-36  g/dl


 


 


Platelet Count 257   130-400  K/uL


 


Mean Platelet Volume 10.1   7.4-10.4  fL


 


Neutrophils (%) (Auto) 90.9    %


 


Lymphocytes (%) (Auto) 5.7    %


 


Monocytes (%) (Auto) 2.9    %


 


Eosinophils (%) (Auto) 0.1    %


 


Basophils (%) (Auto) 0.2    %


 


Neutrophils # (Auto) 11.88   1.4-6.5  K/uL


 


Lymphocytes # (Auto) 0.75   1.2-3.4  K/uL


 


Monocytes # (Auto) 0.38   0.11-0.59  K/uL


 


Eosinophils # (Auto) 0.01   0-0.5  K/uL


 


Basophils # (Auto) 0.02   0-0.2  K/uL


 


RDW Standard Deviation 47.6   36.4-46.3  fL


 


RDW Coefficient of Variation 15.2   11.5-14.5  %


 


Immature Granulocyte % (Auto) 0.2    %


 


Immature Granulocyte # (Auto) 0.02   0.00-0.02  K/uL


 


Sodium Level 136   136-145  mmol/L


 


Potassium Level 4.1   3.5-5.1  mmol/L


 


Chloride Level 105     mmol/L


 


Carbon Dioxide Level 23   21-32  mmol/L


 


Anion Gap 8.0   3-11  mmol/L


 


Blood Urea Nitrogen 15   7-18  mg/dl


 


Creatinine


  1.00


  


  


  0.60-1.20


mg/dl


 


Est Creatinine Clear Calc


Drug Dose 95.5


  


  


   ml/min


 


 


Estimated GFR (


American) 82.2


  


  


   


 


 


Estimated GFR (Non-


American 70.9


  


  


   


 


 


BUN/Creatinine Ratio 15.2   10-20  


 


Random Glucose 202   70-99  mg/dl


 


Calcium Level 9.1   8.5-10.1  mg/dl


 


Total Bilirubin 0.3   0.2-1  mg/dl


 


Direct Bilirubin < 0.1   0-0.2  mg/dl


 


Aspartate Amino Transf


(AST/SGOT) 17


  


  


  15-37  U/L


 


 


Alanine Aminotransferase


(ALT/SGPT) 28


  


  


  12-78  U/L


 


 


Alkaline Phosphatase 93     U/L


 


Total Creatine Kinase 58     U/L


 


Creatine Kinase MB < 0.5   0.5-3.6  ng/ml


 


Creatine Kinase MB Ratio    0-3.0  


 


Troponin I < 0.015   0-0.045  ng/ml


 


Total Protein 8.1   6.4-8.2  gm/dl


 


Albumin 3.4   3.4-5.0  gm/dl


 


Lipase 105     U/L


 


Urine Pregnancy Test  NEG  NEG  


 


Prothrombin Time


  


  


  10.1


  9.0-12.0


SECONDS


 


Prothromb Time International


Ratio 


  


  1.0


  0.9-1.1  


 








Microbiology Results


3/7/18 Blood Culture, Received


         Pending


3/7/18 Blood Culture, Ordered


         Pending


3/7/18 Urine Culture, Received


         Pending





Diagnostic Radiology


SINGLE VIEW CHEST





CLINICAL HISTORY:  Atypical chest pain.





FINDINGS: An AP, portable, upright chest radiograph is obtained. No prior


studies are available for comparison at the time of dictation. The examination


is degraded by portable technique and patient rotation.  The cardiomediastinal


silhouette is unremarkable. The lungs and pleural spaces are clear. No


pneumothorax is seen. The bony thorax is grossly intact.





IMPRESSION: No active disease in the chest.





EKG








Impression


Assessment and Plan


38-year-old female presents with acute onset fever chills tachycardia chest 

pain and shortness of breath status post upper endoscopy earlier today.





1.  Sepsis-fever, tachycardia, and white count present.  Patient is ill-

appearing.  Differential diagnosis includes but is not limited to allergic 

reaction to Keflex given earlier today with known history of cephalosporin 

allergy, possible aspiration pneumonia, pulmonary embolism, viral infection 

such as flu, other infection such as UTI, or complication such as per 

microperforation from procedure earlier today.  Blood cultures are pending, 

lactate pending.  Will cover with broad-spectrum antibiotics to include 

vancomycin and aztreonam.  CT chest to rule out PE.  Flu PCR.  Continue IV 

fluids overnight.  Urinalysis.  Consult GI.


2.  Chronic pain and fibromyalgia-continue Norco per home regimen.


3.  Anxiety continue Savella, Xanax, Lyrica, Trintellix





DVT proph- Lovenox


Full Code


Dispo-tele





ADDENDUM: Overnight patient had allergic reaction to vancomycin in the form of 

the rash without hives.  Benadryl was given and vancomycin was added to allergy 

list.  Daptomycin is a replacement alternative for gram-positive coverage.  

Continuing aztreonam for now with consideration of dapto per primary team in 

the morning.





Brianna Solis DO


[facility] hospitalist





Resuscitation Status








VTE Prophylaxis


Will order VTE Prophylaxis:  Yes

## 2018-03-07 NOTE — DIAGNOSTIC IMAGING REPORT
CT ANGIOGRAM OF THE CHEST



CLINICAL HISTORY: Atypical chest pain.



COMPARISON STUDY:  Chest x-ray dated 3/7/2018



TECHNIQUE: Following the IV administration of 103 cc of Optiray 320, CT

angiogram of the chest was performed from the upper abdomen to the thoracic

inlet utilizing the pulmonary embolus protocol. Images are reviewed in the

axial, sagittal, and coronal planes. 3-D MIPS images are created and assessed.

IV contrast was administered without complication.  A dose lowering technique

was utilized adhering to the principles of ALARA. The examination is

significantly degraded by motion artifact.



CT DOSE: 649.32 mGy.cm



FINDINGS:



Thyroid: Atrophic.



Thoracic aorta: The thoracic aorta is normal in caliber and demonstrates

standard 3-vessel arch anatomy. No dissection is seen.



Pulmonary vasculature: The pulmonary trunk is normal in caliber. There are no

filling defects identified in main, lobar, or proximal segmental pulmonary

branches to suggest pulmonary embolus. Evaluation of the peripheral vessels is

degraded by motion artifact.



Heart: The heart is normal in size and configuration, and without pericardial

effusion.



Lungs and pleural spaces: Evaluation of the lung parenchyma is degraded by

respiratory motion artifact. No airspace consolidation or pleural effusion is

identified. Linear atelectasis is seen in the left lung base. The trachea and

central airways are clear.



Mediastinum: There is no mediastinal lymphadenopathy.



Ana: Clear.



Axillae: There is no axillary lymphadenopathy.



Upper abdomen: The liver is steatotic. The spleen is enlarged, measuring 15.3 cm

in length. A tiny hiatal hernia is noted.



Skeletal structures: No lytic or blastic bony lesions are seen.





IMPRESSION:



1. Motion compromised examination.



2. There is no evidence of central pulmonary embolus in the main, lobar, or

proximal segmental pulmonary arteries. 



3. The lungs are clear.



4. Hepatic steatosis.



5. Splenomegaly.







Electronically signed by:  Sahil Aranda M.D.

3/7/2018 9:57 PM



Dictated Date/Time:  3/7/2018 9:54 PM

## 2018-03-07 NOTE — ANESTHESIOLOGY PROGRESS NOTE
Anesthesia Post Op Note


Date & Time


Mar 7, 2018 at 11:47





Vital Signs


Pain Intensity:  0





Vital Signs Past 12 Hours








  Date Time  Temp Pulse Resp B/P (MAP) Pulse Ox O2 Delivery O2 Flow Rate FiO2


 


3/7/18 11:42  71 16 136/85 (102) 100 Room Air  


 


3/7/18 10:14    149/91 (110)    


 


3/7/18 10:06 36.7 95 16 159/115 (130) 96 Room Air  











Notes


Mental Status:  alert / awake / arousable, participated in evaluation


Pt Amnestic to Procedure:  Yes


Nausea / Vomiting:  adequately controlled


Pain:  adequately controlled


Airway Patency, RR, SpO2:  stable & adequate


BP & HR:  stable & adequate


Hydration State:  stable & adequate


Anesthetic Complications:  no major complications apparent

## 2018-03-08 VITALS
DIASTOLIC BLOOD PRESSURE: 92 MMHG | HEART RATE: 73 BPM | TEMPERATURE: 97.7 F | SYSTOLIC BLOOD PRESSURE: 139 MMHG | OXYGEN SATURATION: 96 %

## 2018-03-08 VITALS
HEART RATE: 79 BPM | OXYGEN SATURATION: 98 % | SYSTOLIC BLOOD PRESSURE: 127 MMHG | DIASTOLIC BLOOD PRESSURE: 77 MMHG | TEMPERATURE: 97.7 F

## 2018-03-08 VITALS
DIASTOLIC BLOOD PRESSURE: 82 MMHG | HEART RATE: 88 BPM | SYSTOLIC BLOOD PRESSURE: 120 MMHG | OXYGEN SATURATION: 98 % | TEMPERATURE: 97.52 F

## 2018-03-08 VITALS
DIASTOLIC BLOOD PRESSURE: 84 MMHG | TEMPERATURE: 97.34 F | OXYGEN SATURATION: 95 % | SYSTOLIC BLOOD PRESSURE: 131 MMHG | HEART RATE: 96 BPM

## 2018-03-08 VITALS
DIASTOLIC BLOOD PRESSURE: 68 MMHG | HEART RATE: 80 BPM | OXYGEN SATURATION: 97 % | TEMPERATURE: 97.52 F | SYSTOLIC BLOOD PRESSURE: 121 MMHG

## 2018-03-08 LAB
BASOPHILS # BLD: 0.01 K/UL (ref 0–0.2)
BASOPHILS NFR BLD: 0.1 %
BUN SERPL-MCNC: 12 MG/DL (ref 7–18)
CALCIUM SERPL-MCNC: 8.3 MG/DL (ref 8.5–10.1)
CO2 SERPL-SCNC: 23 MMOL/L (ref 21–32)
CREAT SERPL-MCNC: 0.7 MG/DL (ref 0.6–1.2)
EOS ABS #: 0.01 K/UL (ref 0–0.5)
EOSINOPHIL NFR BLD AUTO: 243 K/UL (ref 130–400)
GLUCOSE SERPL-MCNC: 129 MG/DL (ref 70–99)
HCT VFR BLD CALC: 35.3 % (ref 37–47)
HGB BLD-MCNC: 12.1 G/DL (ref 12–16)
IG#: 0.03 K/UL (ref 0–0.02)
IMM GRANULOCYTES NFR BLD AUTO: 14.6 %
LYMPHOCYTES # BLD: 1.94 K/UL (ref 1.2–3.4)
MCH RBC QN AUTO: 29 PG (ref 25–34)
MCHC RBC AUTO-ENTMCNC: 34.3 G/DL (ref 32–36)
MCV RBC AUTO: 84.7 FL (ref 80–100)
MONO ABS #: 0.3 K/UL (ref 0.11–0.59)
MONOCYTES NFR BLD: 2.3 %
NEUT ABS #: 10.98 K/UL (ref 1.4–6.5)
NEUTROPHILS # BLD AUTO: 0.1 %
NEUTROPHILS NFR BLD AUTO: 82.7 %
PMV BLD AUTO: 9.8 FL (ref 7.4–10.4)
POTASSIUM SERPL-SCNC: 3.8 MMOL/L (ref 3.5–5.1)
RED CELL DISTRIBUTION WIDTH CV: 15.4 % (ref 11.5–14.5)
RED CELL DISTRIBUTION WIDTH SD: 47.4 FL (ref 36.4–46.3)
SODIUM SERPL-SCNC: 139 MMOL/L (ref 136–145)
WBC # BLD AUTO: 13.27 K/UL (ref 4.8–10.8)

## 2018-03-08 RX ADMIN — AZTREONAM SCH MLS/HR: 2 INJECTION, POWDER, LYOPHILIZED, FOR SOLUTION INTRAMUSCULAR; INTRAVENOUS at 05:40

## 2018-03-08 RX ADMIN — ALUMINUM ZIRCONIUM TRICHLOROHYDREX GLY SCH EA: 0.2 STICK TOPICAL at 00:00

## 2018-03-08 RX ADMIN — AZTREONAM SCH MLS/HR: 2 INJECTION, POWDER, LYOPHILIZED, FOR SOLUTION INTRAMUSCULAR; INTRAVENOUS at 13:53

## 2018-03-08 RX ADMIN — ALUMINUM ZIRCONIUM TRICHLOROHYDREX GLY SCH EA: 0.2 STICK TOPICAL at 08:00

## 2018-03-08 RX ADMIN — RANITIDINE SCH MG: 150 TABLET ORAL at 08:54

## 2018-03-08 RX ADMIN — ALUMINUM ZIRCONIUM TRICHLOROHYDREX GLY SCH EA: 0.2 STICK TOPICAL at 16:00

## 2018-03-08 RX ADMIN — PREGABALIN SCH MG: 150 CAPSULE ORAL at 09:02

## 2018-03-08 NOTE — EMERGENCY ROOM VISIT NOTE
History


Report prepared by Uche:  Claudio Yarbrough


Under the Supervision of:  Dr. Casandra Ivy D.O.


First contact with patient:  17:53


Chief Complaint:  CHEST PAIN


Stated Complaint:  CARLOS PAIN, COUGHING, FEVER





History of Present Illness


The patient is a 38 year old female who presents to the Emergency Room via EMS 

with complaints of constant chest and abdominal pain that began earlier today 

following an EGD. Per EMS the patient had an EGD performed here at [facility] at 1100 this morning, 7 hours prior to arrival. After the procedure the 

patient began to cough uncontrollably and was unable to speak. They gave her a 

Duoneb Treatment with inhalers and sent her home. When she got home she 

continued to cough and developed the chest and abdominal pain. She describes 

her chest pain as a soreness. EMS adds that she did vomit just prior to their 

arrival. She has no history of asthma or COPD. The scope today was performed 

for bloating and abdominal pain. She had  a scope performed 7 years ago without 

any complications. This prior study showed some inflammation of her stomach.





   Source of History:  patient


   Onset:  7 hours PTA


   Position:  chest, abdomen


   Quality:  other (Soreness)


   Timing:  constant


   Associated Symptoms:  + cough, + nausea, + vomiting





Review of Systems


See HPI for pertinent positives & negatives. A total of 10 systems reviewed and 

were otherwise negative.





Past Medical & Surgical


Medical Problems:


(1) Anxiety


(2) Constipation


(3) Depression


(4) Fever and chills


(5) Hypertension


(6) Hypothyroidism


(7) IBS (irritable bowel syndrome)








Family History





Cancer


Diabetes mellitus


FH: cancer


FH: lung disease


Heart disease


Hypertension





Social History


Drug Use:  none


Marital Status:  in relationship


Housing Status:  lives with family


Occupation Status:  unemployed





Current/Historical Medications


Scheduled


Hydrocodone/Acetaminophen 5MG/325MG (Norco 5MG/325MG), 1 TABLET PO HS


Hydrocodone/Acetaminophen 5MG/325MG (Norco 5MG/325MG), 2 TABLETS PO QAM


Levothyroxine Sodium (Levothyroxine Sodium), 200 MCG PO QAM


Lisinopril (Lisinopril), 2.5 MG PO QAM


Milnacipran Hcl (Savella), 50 MG PO BID


Pregabalin (Lyrica), 150 MG PO BID


Probiotic Product (Probiotic), 1 CAP PO QAM


Ranitidine HCl (Ranitidine HCl), 150 MG PO BID


Vortioxetine HBr (Trintellix), 2 TAB PO QAM





Scheduled PRN


Alprazolam (Xanax), 1 TAB PO TID PRN for Anxiety/Agitation


Polyethylene (Polyethylene Glycol 3350), 17 GM PO DAILY PRN for Constipation


Prochlorperazine Maleate (Prochlorperazine Maleate), 5 MG PO TID PRN for Nausea





Allergies


Coded Allergies:  


     Sulfa Antibiotics (Verified  Allergy, Intermediate, rash,hives sob, 3/2/18)


     Doxycycline (Verified  Allergy, Unknown, Rash and Hives, 3/2/18)


 Reported by PT


     Meloxicam (Verified  Allergy, Unknown, Rash and Hives, 3/2/18)


 Reported by PT


     Ondansetron (Verified  Allergy, Unknown, Nausea/Vomiting, 3/2/18)


 Reported by PT


     Penicillins (Verified  Allergy, Unknown, Rash,Hives and difficulty 

breathing., 3/2/18)


 Reported by PT





Physical Exam


Vital Signs











  Date Time  Temp Pulse Resp B/P (MAP) Pulse Ox O2 Delivery O2 Flow Rate FiO2


 


3/7/18 19:33 37.9 130 18 134/84 94 Room Air  


 


3/7/18 18:56 37.7 123 18 138/85 95 Room Air  


 


3/7/18 18:11  139      


 


3/7/18 17:56      Room Air  


 


3/7/18 17:56 38.1 140 22 156/100 98 Room Air  











Physical Exam


GENERAL: Sitting up in bed, alert, well appearing, well nourished, no distress, 

non-toxic. Patient is talking in full sentences. 


EYE EXAM: normal conjunctiva. 


OROPHARYNX: no exudate, no erythema, lips, buccal mucosa, and tongue normal and 

mucous membranes are moist


NECK: supple, no nuchal rigidity, no adenopathy, non-tender


LUNGS: Faint Expiratory wheezes bilaterally. Normal chest wall mechanics


HEART: no murmurs, S1 normal and S2 normal 


CHEST: There is acute reproducible anterior chest wall pain. 


ABDOMEN: abdomen soft, non-tender, normo-active bowel sounds, no masses, no 

rebound or guarding. 


BACK: Back is symmetrical on inspection and there is no deformity, no midline 

tenderness, no CVA tenderness. 


SKIN: no rashes and no bruising 


UPPER EXTREMITIES: upper extremities are grossly normal. 


LOWER EXTREMITIES: No pitting edema.


NEURO EXAM: Normal sensorium, cranial nerves II-XII grossly intact, normal 

speech,  no gross weakness of arms, no gross weakness of legs.





Medical Decision & Procedures


ER Provider


Diagnostic Interpretation:


Radiology results as stated below per my review and the radiologist's 

interpretation: 





SINGLE VIEW CHEST





CLINICAL HISTORY:  Atypical chest pain.





FINDINGS: An AP, portable, upright chest radiograph is obtained. No prior


studies are available for comparison at the time of dictation. The examination


is degraded by portable technique and patient rotation.  The cardiomediastinal


silhouette is unremarkable. The lungs and pleural spaces are clear. No


pneumothorax is seen. The bony thorax is grossly intact.





IMPRESSION: No active disease in the chest. 











Electronically signed by:  Sahil Aranda M.D.


3/7/2018 6:19 PM





Dictated Date/Time:  3/7/2018 6:19 PM





Laboratory Results


3/7/18 18:11








Red Blood Count 4.53, Mean Corpuscular Volume 85.7, Mean Corpuscular Hemoglobin 

28.7, Mean Corpuscular Hemoglobin Concent 33.5, Mean Platelet Volume 10.1, 

Neutrophils (%) (Auto) 90.9, Lymphocytes (%) (Auto) 5.7, Monocytes (%) (Auto) 

2.9, Eosinophils (%) (Auto) 0.1, Basophils (%) (Auto) 0.2, Neutrophils # (Auto) 

11.88, Lymphocytes # (Auto) 0.75, Monocytes # (Auto) 0.38, Eosinophils # (Auto) 

0.01, Basophils # (Auto) 0.02





3/7/18 18:11

















Test


  3/7/18


18:11 3/7/18


20:45


 


White Blood Count


  13.06 K/uL


(4.8-10.8) 


 


 


Red Blood Count


  4.53 M/uL


(4.2-5.4) 


 


 


Hemoglobin


  13.0 g/dL


(12.0-16.0) 


 


 


Hematocrit 38.8 % (37-47)  


 


Mean Corpuscular Volume


  85.7 fL


() 


 


 


Mean Corpuscular Hemoglobin


  28.7 pg


(25-34) 


 


 


Mean Corpuscular Hemoglobin


Concent 33.5 g/dl


(32-36) 


 


 


Platelet Count


  257 K/uL


(130-400) 


 


 


Mean Platelet Volume


  10.1 fL


(7.4-10.4) 


 


 


Neutrophils (%) (Auto) 90.9 %  


 


Lymphocytes (%) (Auto) 5.7 %  


 


Monocytes (%) (Auto) 2.9 %  


 


Eosinophils (%) (Auto) 0.1 %  


 


Basophils (%) (Auto) 0.2 %  


 


Neutrophils # (Auto)


  11.88 K/uL


(1.4-6.5) 


 


 


Lymphocytes # (Auto)


  0.75 K/uL


(1.2-3.4) 


 


 


Monocytes # (Auto)


  0.38 K/uL


(0.11-0.59) 


 


 


Eosinophils # (Auto)


  0.01 K/uL


(0-0.5) 


 


 


Basophils # (Auto)


  0.02 K/uL


(0-0.2) 


 


 


RDW Standard Deviation


  47.6 fL


(36.4-46.3) 


 


 


RDW Coefficient of Variation


  15.2 %


(11.5-14.5) 


 


 


Immature Granulocyte % (Auto) 0.2 %  


 


Immature Granulocyte # (Auto)


  0.02 K/uL


(0.00-0.02) 


 


 


Anion Gap


  8.0 mmol/L


(3-11) 


 


 


Est Creatinine Clear Calc


Drug Dose 95.5 ml/min 


  


 


 


Estimated GFR (


American) 82.2 


  


 


 


Estimated GFR (Non-


American 70.9 


  


 


 


BUN/Creatinine Ratio 15.2 (10-20)  


 


Calcium Level


  9.1 mg/dl


(8.5-10.1) 


 


 


Total Bilirubin


  0.3 mg/dl


(0.2-1) 


 


 


Direct Bilirubin


  < 0.1 mg/dl


(0-0.2) 


 


 


Aspartate Amino Transf


(AST/SGOT) 17 U/L (15-37) 


  


 


 


Alanine Aminotransferase


(ALT/SGPT) 28 U/L (12-78) 


  


 


 


Alkaline Phosphatase


  93 U/L


() 


 


 


Total Creatine Kinase


  58 U/L


() 


 


 


Creatine Kinase MB


  < 0.5 ng/ml


(0.5-3.6) 


 


 


Creatine Kinase MB Ratio  (0-3.0)  


 


Total Protein


  8.1 gm/dl


(6.4-8.2) 


 


 


Albumin


  3.4 gm/dl


(3.4-5.0) 


 


 


Lipase


  105 U/L


() 


 


 


Urine Pregnancy Test  NEG (NEG) 





Laboratory results per my review.





Medications Administered











 Medications


  (Trade)  Dose


 Ordered  Sig/Bartolome


 Route  Start Time


 Stop Time Status Last Admin


Dose Admin


 


 Albuterol Sulfate


  (Ventolin 0.5%


 2.5MG/0.5ML Neb)  2.5 mg  NOW  STAT


 INH  3/7/18 17:57


 3/7/18 17:59 DC 3/7/18 17:57


2.5 MG


 


 Sodium Chloride  1,000 ml @ 


 999 mls/hr  Q1H1M STAT


 IV  3/7/18 17:57


 3/7/18 18:57 DC 3/7/18 18:05


999 MLS/HR


 


 Sodium Chloride  1,000 ml @ 


 999 mls/hr  Q1H1M STAT


 IV  3/7/18 19:36


 3/7/18 20:36 DC 3/7/18 19:50


999 MLS/HR


 


 Morphine Sulfate


  (MoRPHine


 SULFATE INJ)  6 mg  NOW  STAT


 IV  3/7/18 19:36


 3/7/18 19:37 DC 3/7/18 19:50


6 MG











ECG Per My Interpretation


Indication:  chest pain


Rate (beats per minute):  135


Rhythm:  sinus tachycardia


Findings:  nonspecific-ST abn (Lateral), other (Normal Axis)





ED Course


ED COURSE: 


Vital signs were reviewed and showed Tachycardic and Hypertensive vitals. 


The patients medical record was reviewed


The above diagnostic studies were performed and reviewed.


ED treatments and interventions as stated above. 





1754: The patient was evaluated in room B9. A complete history and physical 

examination was performed.





1757: Ordered Zofran 4 mg IV, Sodium Chloride 1000 mL @ 999 mL/hr IV, Albuterol 

Sulfate 2.5 mg INH. 





1936: Ordered Zofran 4 mg IV, Morphine Sulfate 6 mg IV, Sodium Chloride 1000 mL 

@ 999 mL/hr IV. 





1943: I discussed the case with Dr. Brianna Solis [facility] Hospitalist. She 

will evaluate the patient for further treatment. 





1945: Upon reevaluation, the patient is resting in bed.I discussed my findings 

with the patient and she understands and agrees with the treatment plan.   


Based on the patients age, coexisting illnesses, exam and lab findings the 

decision to treat as an inpatient was made.


The patient remained stable while under my care.





The patient will be evaluated for further management.





Medical Decision


Differential diagnoses includes but is not limited to acute coronary syndrome, 

myocardial infarction, pericarditis, pulmonary embolus, aortic dissection, 

pneumonia, pneumothorax, musculoskeletal, shingles, esophageal. 





Patient is a 38-year-old female who presents the ER following an EGD earlier 

today.  Following the EGD she had coughing and a sore throat.  She is given a 

neb treatment and discharge.  CBC shows a mild leukocytosis.  BMP along with 

LFTs, troponin and lipase was normal.  Chest x-ray was unremarkable.  Patient 

was persistently tachycardic on 2 L in the ER.  Patient was febrile.  She was 

given IV antibiotics.  I do favor this is secondary to a pneumonitis although 

chest x-ray was clear.  Patient has absolutely no other complaints.  Her belly 

was benign.  Patient and family were updated at bedside.  Patient was discussed 

with internal medicine and admitted for further workup.





Medication Reconcilliation


Current Medication List:  was personally reviewed by me





Blood Pressure Screening


Patient's blood pressure:  Elevated blood pressure


Referred to hospitalist.





Consults


Time Called:  1940


Consulting Physician:  Dr. Brianna Jensen Hospitalist


Returned Call:  1943


I discussed the case with Dr. Brianna Jensen Hospitalist. She will 

evaluate the patient for further treatment.





Impression





 Primary Impression:  


 Pneumonitis


 Additional Impression:  


 Fever and chills





Scribe Attestation


The scribe's documentation has been prepared under my direction and personally 

reviewed by me in its entirety. I confirm that the note above accurately 

reflects all work, treatment, procedures, and medical decision making performed 

by me.





Departure Information


Dispostion


Being Evaluated By Hospitalist





Patient Instructions


My [facility]





Problem Qualifiers

## 2018-03-08 NOTE — DISCHARGE INSTRUCTIONS
Discharge Instructions


Date of Service


Mar 8, 2018.





Admission


Reason for Admission:  Fever And Chills





Discharge


Discharge Diagnosis / Problem:  ESOPHAGITIS /GERD /ASPIRATION PNEUMONITIS





Discharge Goals


Goal(s):  Improve disease control, Diagnostic testing, Therapeutic intervention





Activity Recommendations


Activity Limitations:  resume your previous activity





.





Instructions / Follow-Up


Instructions / Follow-Up


YOU WILL HAVE SCHEDULED  APPOINTMENT IN A WEEK WITH DR TALAMANTES FOR HOSPITAL 

FOLLOW UP 





OFFICE WILL CALL WITH DATE AND TIME





Current Hospital Diet


Patient's current hospital diet: Regular Diet





Discharge Diet


Recommended Diet:  Regular Diet





Pending Studies


Studies pending at discharge:  no





Medical Emergencies








.


Who to Call and When:





Medical Emergencies:  If at any time you feel your situation is an emergency, 

please call 911 immediately.





.





Non-Emergent Contact


Non-Emergency issues call your:  Primary Care Provider





.


.








"Provider Documentation" section prepared by Heidi Quiles.








.

## 2018-03-08 NOTE — DISCHARGE SUMMARY
Discharge Summary


Date of Service


Mar 8, 2018.





Discharge Summary


Admission Date:


Mar 7, 2018 at 21:05


Discharge Date:  Mar 8, 2018


Discharge Disposition:  Home


Principal Diagnosis:


ESOPHAGITIS /GERD /ASPIRATION PNEUMONITIS


Procedures:


CT chest with contrast:





IMPRESSION:





1. Motion compromised examination.





2. There is no evidence of central pulmonary embolus in the main, lobar, or


proximal segmental pulmonary arteries. 





3. The lungs are clear.





4. Hepatic steatosis.





5. Splenomegaly.


Consultations:


[facility]





Medication Reconciliation


New Medications:  


Omeprazole (Omeprazole) 20 Mg Tab


1 TAB PO DAILY for 90 Days, #90 TAB 1 Refill





 


Continued Medications:  


Alprazolam (Xanax) 0.25 Mg Tab


1 TAB PO TID PRN for Anxiety/Agitation for 30 Days, #90 TAB





Hydrocodone/Acetaminophen 5MG/325MG (Norco 5MG/325MG)  Tab


1 TABLET PO HS, TAB





Hydrocodone/Acetaminophen 5MG/325MG (Norco 5MG/325MG)  Tab


2 TABLETS PO QAM, TAB





Levothyroxine Sodium (Levothyroxine Sodium) 200 Mcg Tab


200 MCG PO QAM





Lisinopril (Lisinopril) 2.5 Mg Tab


2.5 MG PO QAM





Milnacipran Hcl (Savella) 50 Mg Tab


50 MG PO BID





Polyethylene (Polyethylene Glycol 3350) 527 Gm Soln


17 GM PO DAILY PRN for Constipation


DISSOLVE IN WATER.


Pregabalin (Lyrica) 150 Mg Cap


150 MG PO BID





Probiotic Product (Probiotic) 1 Cap Cap


1 CAP PO QAM





Prochlorperazine Maleate (Prochlorperazine Maleate) 5 Mg Tab


5 MG PO TID PRN for Nausea





Ranitidine HCl (Ranitidine HCl) 150 Mg Tab


150 MG PO BID





Vortioxetine HBr (Trintellix) 10 Mg Tab


2 TAB PO QAM











Admission Information


HPI (per Admitting provider):


The patient is a 38-year-old female who presents to the emergency room via EMS 

with complaints of constant chest pain and abdominal pain beginning earlier 

today following an upper endoscopy.  The patient states that after the 

procedure she began to cough uncontrollably and was unable to speak.  She was 

given a DuoNeb treatment with inhalers and sent home.  When she got home she 

continued to cough and developed chest pain trouble breathing and abdominal 

pain.  She describes her chest pain as a soreness.  She reports vomiting one 

time prior to arrival to the ER.  Vomitus was described as mucousy without 

blood.  She has no history of asthma or COPD.  The endoscopy was performed for 

chronic bloating and abdominal pain.  She reports her abdominal pain and chest 

pain are improved since the breathing treatment.  She does report a headache 

likely secondary to the excessive coughing.  The coughing and difficulty 

breathing have improved.  She is not in respiratory distress at this time.  She 

does report fevers and chills that began shortly after arriving home today.  

She reports that when she got home her shortness of breath became worse and her 

chest pain felt tighter wrapping around her shoulder blades.  She denies a 

history of blood clot but does report a history of multiple spontaneous 

abortions.  Review of system reveals chronic constipation.  She denies diarrhea 

or blood per rectum.  She reports irregular menstruation with last menstrual 

period in January 2018.  She denies any vaginal discharge or urinary tract 

infection symptoms such as dysuria urgency or flank pain.  Prior to the 

procedure she was in her normal state of health.


Physical Exam (per Admitting):  


   General Appearance:  no apparent distress, + obese, + pertinent finding (

diaphoretic and flushed, no rash)


   Head:  normocephalic, atraumatic


   Eyes:  normal inspection, PERRL, sclerae normal


   ENT:  normal ENT inspection, pharynx normal, + pertinent finding (cerumen 

bilaterally prevented visualization of TMs. )


   Neck:  supple, no adenopathy, trachea midline, + pertinent finding (no sinus 

TTP)


   Respiratory/Chest:  chest non-tender, lungs clear, normal breath sounds, no 

respiratory distress, no accessory muscle use


   Cardiovascular:  no edema, no gallop, no JVD, no murmur, normal peripheral 

pulses, + tachycardia


   Abdomen/GI:  normal bowel sounds, soft, + tenderness (epigastric region)


   Back:  normal inspection, no CVA tenderness


   Extremities/Musculoskelatal:  normal inspection, no calf tenderness, no 

pedal edema, normal range of motion


   Neurologic/Psych:  CNs II-XII nml as tested, no motor/sensory deficits, alert

, normal mood/affect, oriented x 3


   Skin:  normal color, warm/dry, no rash





Hospital Course





No complaint of shortness of breath, no cough,


No fever or chills


Vitals remained stable


Evaluated by gastroenterology today


Added PPI/omeprazole for acid reflux/esophagitis 


stable to be discharged home today





Physical exam:


General: Very pleasant young female no sign of any distress


HEENT: Sclera nonicteric, pupils bilateral equal reactive to light, EOMI


Heart: Regular S1-S2 no murmur or gallop


Lungs: Clear to auscultate no wheezes or rales


Abdomen: Soft nontender active bowel sounds


Extremity: No lower extremity edema


Neuro: Alert awake oriented 3, no focal neurological deficit





Assessment and plan:








Status post EGD yesterday for severe GERD symptom


EGD showed mild esophagitis


Postoperative procedure patient developed cough, shortness of breath


Possible aspiration pneumonitis, in the setting of severe acid reflux symptoms


CT chest with contrast:Shows no evidence of any PE


Lungs clear


No indication for antibiotic, no evidence of sepsis


Added omeprazole 20 mg daily





GERD symptoms:


Appreciate input from GI


EGD shows:There was evidence of esophagitis, otherwise normal with stomach and 

small bowel biopsies. 


Patient will be continued with PPI


-No other GI testing/intervention needed








Full code





Disposition:


Stable to be discharged home today

















sepsis : due to aspiration pneumonitis


Total time spent on discharge = 35 mins 


This includes examination of the patient, discharge planning, medication 

reconciliation, and communication with other providers.





Discharge Instructions


Discharge Instructions


Date of Service


Mar 8, 2018.





Admission


Reason for Admission:  Fever And Chills





Discharge


Discharge Diagnosis / Problem:  ESOPHAGITIS /GERD /ASPIRATION PNEUMONITIS





Discharge Goals


Goal(s):  Improve disease control, Diagnostic testing, Therapeutic intervention





Activity Recommendations


Activity Limitations:  resume your previous activity





.





Instructions / Follow-Up


Instructions / Follow-Up


YOU WILL HAVE SCHEDULED  APPOINTMENT IN A WEEK WITH DR TALAMANTES FOR HOSPITAL 

FOLLOW UP 





OFFICE WILL CALL WITH DATE AND TIME





Current Hospital Diet


Patient's current hospital diet: Regular Diet





Discharge Diet


Recommended Diet:  Regular Diet





Pending Studies


Studies pending at discharge:  no





Medical Emergencies








.


Who to Call and When:





Medical Emergencies:  If at any time you feel your situation is an emergency, 

please call 911 immediately.





.





Non-Emergent Contact


Non-Emergency issues call your:  Primary Care Provider





.


.








"Provider Documentation" section prepared by Heidi Quiles.








.

## 2018-03-08 NOTE — GASTROINTESTINAL CONSULTATION
Gastrointestinal Consultation


Date of Consultation:  Mar 8, 2018


Attending Physician:  Kb


Consulting Physician:  Charity


Reason for Consultation:  fever after EGD


History of Present Illness


Patient is a 38 year old female w/ history listed below who presented to [facility] 

following EGD yesterday for fever, chest tightness and back pain. Pt was seen 

and evaluated, chart reviewed. Pt has a GI history of chronic constipation, 

upper abd pain, bloating and fullness she was evaluated by SOLITARIO Barriga 

as an outpatient who arranged EGD w/ biopsy for evaluation. Pt presented to 

[facility] yesterday for EGD. There was evidence of esophagitis, otherwise normal 

with stomach and small bowel biopsies. The pt did not have esophageal dilation. 

The pt tolerated the procedure well. When she woke up, per nursing staff she 

did have some coughing and hoarseness, however, felt well. She went home and 

noted persistent coughing. She developed chest fullness and pressure into her 

back w/ fever and chills. No nausea, vomiting, diarrhea. She came to ED. In the 

ED, troponin, chest XR and chest CT were negative. EKG w/ tachycardia rates 

125. She was started on IV ABX for septic protocol, had a reaction to Keflex 

and Vancomycin w/ rash, no hives. Was started on Daptomycin. This AM she notes 

improvement of all her symptoms except for hoarseness. No fever, chills, CP, SOB

, cough. 





VSS, WBC 13





EGD 3/8/18: LA Grade A reflux esophagitis. Normal stomach. Biopsied. Normal 

examined duodenum. Biopsied.





Past Medical/Surgical History


Medical Problems:


(1) Abdominal cramping


Status: Acute  





(2) Back pain


Status: Acute  





(3) Bacteria in urine


Status: Acute  





(4) Diarrhea


Status: Acute  





(5) Diarrhea


Status: Acute  





(6) Nausea


Status: Acute  





(7) Pneumonitis


Status: Acute  








Past Medical History:


anxiety, depression, constipation, abd pain, abd bloating, fibromyalgia, HTN, 

hypothyroidism, IBS, lupus, ZION, PCOS


Past Surgical History:


EGD, breast reduction, D&C, tonsillectomy





Family History





Cancer


Diabetes mellitus


FH: cancer


FH: lung disease


Heart disease


Hypertension





Social History


Smoking Status:  Never Smoker


Alcohol Use:  none


Drug Use:  none


Marital Status:  in relationship


Housing Status:  lives with family


Occupation Status:  unemployed





Allergies


Coded Allergies:  


     Sulfa Antibiotics (Verified  Allergy, Intermediate, rash,hives sob, 3/2/18)


     Vancomycin (Verified  Allergy, Intermediate, rash, 3/8/18)


     Doxycycline (Verified  Allergy, Unknown, Rash and Hives, 3/2/18)


 Reported by PT


     Meloxicam (Verified  Allergy, Unknown, Rash and Hives, 3/2/18)


 Reported by PT


     Ondansetron (Verified  Allergy, Unknown, Nausea/Vomiting, 3/2/18)


 Reported by PT


     Penicillins (Verified  Allergy, Unknown, Rash,Hives and difficulty 

breathing., 3/2/18)


 Reported by PT





Current Medications





Home Meds and Scripts








 Medications  Dose


 Route/Sig


 Max Daily Dose Days Date Category Dose


Instructions


 


 Xanax


  (Alprazolam) 0.25


 Mg Tab  1 Tab


 PO TID PRN


   30 3/7/18 Reported 


 


 Trintellix


  (Vortioxetine


 HBr) 10 Mg Tab  2 Tab


 PO QAM


    3/2/18 Reported 


 


 Norco 5MG/325MG


  (Acetaminophen/Hydrocodone


 Bitart)  Tab  2 Tablets


 PO QAM


    12/16/17 Reported 


 


 Norco 5MG/325MG


  (Acetaminophen/Hydrocodone


 Bitart)  Tab  1 Tablet


 PO HS


    12/16/17 Reported 


 


 Savella


  (Milnacipran Hcl)


 50 Mg Tab  50 Mg


 PO BID


    12/16/17 Reported 


 


 Lyrica


  (Pregabalin) 150


 Mg Cap  150 Mg


 PO BID


    12/16/17 Reported 


 


 Prochlorperazine


 Maleate 5 Mg Tab  5 Mg


 PO TID PRN


    12/16/17 Reported 


 


 Lisinopril 2.5 Mg


 Tab  2.5 Mg


 PO QAM


    12/16/17 Reported 


 


 Levothyroxine


 Sodium 200 Mcg Tab  200 Mcg


 PO QAM


    3/12/16 Reported 


 


 Probiotic


  (Probiotic


 Product) 1 Cap Cap  1 Cap


 PO QAM


    8/26/15 Reported 


 


 Polyethylene


 Glycol 3350


  (Polyethylene)


 527 Gm Soln  17 Gm


 PO DAILY PRN


    8/26/15 Reported  DISSOLVE IN WATER.


 


 Ranitidine HCl


 150 Mg Tab  150 Mg


 PO BID


    8/26/15 Reported 











Review of Systems


Constitutional:  No fever, No chills, No sweats, No weakness, No fatigue


ENT:  + sore throat, + problem reported (hoarseness), No unusual epistaxis, No 

trouble swallowing


Respiratory:  No cough, No sputum, No shortness of breath, No dyspnea on 

exertion


Cardiac:  No chest pain, No edema, No palpitations


Abdomen:  No pain, No nausea, No vomiting, No diarrhea, No constipation, No GI 

bleeding, No dysphagia, No odynophagia


Skin:  No rash, No itch, No bleeding, No jaundice





Physical Exam











  Date Time  Temp Pulse Resp B/P (MAP) Pulse Ox O2 Delivery O2 Flow Rate FiO2


 


3/8/18 07:28 36.5 79 19 127/77 (94) 98 Room Air  


 


3/8/18 04:00      Room Air  


 


3/8/18 03:33 36.4 88 18 120/82 (95) 98 Room Air  


 


3/8/18 01:43 36.3 96 19 131/84 (100) 95 Room Air  


 


3/8/18 00:00      Room Air  


 


3/7/18 23:35 36.8 98 18 129/81 (97) 95 Room Air  


 


3/7/18 22:13 36.9 112 18 122/79 93 Room Air  


 


3/7/18 21:50 37.1 118 22 164/70 93 Room Air  


 


3/7/18 19:33 37.9 130 18 134/84 94 Room Air  


 


3/7/18 18:56 37.7 123 18 138/85 95 Room Air  


 


3/7/18 18:11  139      


 


3/7/18 17:56      Room Air  


 


3/7/18 17:56 38.1 140 22 156/100 98 Room Air  








General Appearance:  no apparent distress (pt completed breakfest, is upright 

in bed)


Eyes:  PERRL


ENT:  hearing grossly normal


Neck:  supple, trachea midline


Respiratory/Chest:  lungs clear, normal breath sounds, no respiratory distress, 

no accessory muscle use


Cardiovascular:  regular rate, rhythm, no edema, no gallop, no JVD


Abdomen:  normal bowel sounds, non tender, soft, no organomegaly


Neurologic/Psych:  alert, normal mood/affect, oriented x 3


Skin:  normal color, no jaundice, warm/dry, no rash





Laboratory Results





Last 24 Hours








Test


  3/7/18


18:11 3/7/18


20:45 3/7/18


21:16 3/7/18


21:55


 


White Blood Count 13.06 K/uL    


 


Red Blood Count 4.53 M/uL    


 


Hemoglobin 13.0 g/dL    


 


Hematocrit 38.8 %    


 


Mean Corpuscular Volume 85.7 fL    


 


Mean Corpuscular Hemoglobin 28.7 pg    


 


Mean Corpuscular Hemoglobin


Concent 33.5 g/dl 


  


  


  


 


 


Platelet Count 257 K/uL    


 


Mean Platelet Volume 10.1 fL    


 


Neutrophils (%) (Auto) 90.9 %    


 


Lymphocytes (%) (Auto) 5.7 %    


 


Monocytes (%) (Auto) 2.9 %    


 


Eosinophils (%) (Auto) 0.1 %    


 


Basophils (%) (Auto) 0.2 %    


 


Neutrophils # (Auto) 11.88 K/uL    


 


Lymphocytes # (Auto) 0.75 K/uL    


 


Monocytes # (Auto) 0.38 K/uL    


 


Eosinophils # (Auto) 0.01 K/uL    


 


Basophils # (Auto) 0.02 K/uL    


 


RDW Standard Deviation 47.6 fL    


 


RDW Coefficient of Variation 15.2 %    


 


Immature Granulocyte % (Auto) 0.2 %    


 


Immature Granulocyte # (Auto) 0.02 K/uL    


 


Sodium Level 136 mmol/L    


 


Potassium Level 4.1 mmol/L    


 


Chloride Level 105 mmol/L    


 


Carbon Dioxide Level 23 mmol/L    


 


Anion Gap 8.0 mmol/L    


 


Blood Urea Nitrogen 15 mg/dl    


 


Creatinine 1.00 mg/dl    


 


Est Creatinine Clear Calc


Drug Dose 95.5 ml/min 


  


  


  


 


 


Estimated GFR (


American) 82.2 


  


  


  


 


 


Estimated GFR (Non-


American 70.9 


  


  


  


 


 


BUN/Creatinine Ratio 15.2    


 


Random Glucose 202 mg/dl    


 


Calcium Level 9.1 mg/dl    


 


Total Bilirubin 0.3 mg/dl    


 


Direct Bilirubin < 0.1 mg/dl    


 


Aspartate Amino Transf


(AST/SGOT) 17 U/L 


  


  


  


 


 


Alanine Aminotransferase


(ALT/SGPT) 28 U/L 


  


  


  


 


 


Alkaline Phosphatase 93 U/L    


 


Total Creatine Kinase 58 U/L    


 


Creatine Kinase MB < 0.5 ng/ml    


 


Creatine Kinase MB Ratio     


 


Troponin I < 0.015 ng/ml   < 0.015 ng/ml  


 


Total Protein 8.1 gm/dl    


 


Albumin 3.4 gm/dl    


 


Lipase 105 U/L    


 


Urine Pregnancy Test  NEG   


 


Prothrombin Time   10.1 SECONDS  


 


Prothromb Time International


Ratio 


  


  1.0 


  


 


 


Lactic Acid Level   1.8 mmol/L  


 


Thyroid Stimulating Hormone


(TSH) 


  


  0.551 uIu/ml 


  


 


 


Influenza Type A (RT-PCR)


  


  


  


  Neg for Influ


A


 


Influenza Type B (RT-PCR)


  


  


  


  Neg for Influ


B


 


Test


  3/8/18


02:45 


  


  


 


 


White Blood Count 13.27 K/uL    


 


Red Blood Count 4.17 M/uL    


 


Hemoglobin 12.1 g/dL    


 


Hematocrit 35.3 %    


 


Mean Corpuscular Volume 84.7 fL    


 


Mean Corpuscular Hemoglobin 29.0 pg    


 


Mean Corpuscular Hemoglobin


Concent 34.3 g/dl 


  


  


  


 


 


Platelet Count 243 K/uL    


 


Mean Platelet Volume 9.8 fL    


 


Neutrophils (%) (Auto) 82.7 %    


 


Lymphocytes (%) (Auto) 14.6 %    


 


Monocytes (%) (Auto) 2.3 %    


 


Eosinophils (%) (Auto) 0.1 %    


 


Basophils (%) (Auto) 0.1 %    


 


Neutrophils # (Auto) 10.98 K/uL    


 


Lymphocytes # (Auto) 1.94 K/uL    


 


Monocytes # (Auto) 0.30 K/uL    


 


Eosinophils # (Auto) 0.01 K/uL    


 


Basophils # (Auto) 0.01 K/uL    


 


RDW Standard Deviation 47.4 fL    


 


RDW Coefficient of Variation 15.4 %    


 


Immature Granulocyte % (Auto) 0.2 %    


 


Immature Granulocyte # (Auto) 0.03 K/uL    


 


Sodium Level 139 mmol/L    


 


Potassium Level 3.8 mmol/L    


 


Chloride Level 109 mmol/L    


 


Carbon Dioxide Level 23 mmol/L    


 


Anion Gap 7.0 mmol/L    


 


Blood Urea Nitrogen 12 mg/dl    


 


Creatinine 0.70 mg/dl    


 


Est Creatinine Clear Calc


Drug Dose 136.1 ml/min 


  


  


  


 


 


Estimated GFR (


American) 126.5 


  


  


  


 


 


Estimated GFR (Non-


American 109.1 


  


  


  


 


 


BUN/Creatinine Ratio 16.8    


 


Random Glucose 129 mg/dl    


 


Calcium Level 8.3 mg/dl    











Impression


Patient is a 38 year old female w/ fever, chills, congestion, CP, SOB 6 hours 

after EGD, in the ED w/ elevated WBC, fever, tachycardia. Imaging thus fat has 

been negative including chest XR and CT. She did not have dilation during her 

EGD, doubt GI source of her symptoms. She did have a lot of coughing w/ clear 

sputum following the EGD, perhaps she had some mild aspiration. This am she has 

complete resolution of her symptoms except for hoarseness.





Plan


- Fever unknown etiology


   - doubt GI source


   - likelyhood of microperforation quite low as dilation was not preformed


   - Chest XR negative


   - Chest CT negative


   - Follow up culture


- Suggest acid suppression for esophagitis


   - Omeprazole 20 mg daily


- OP GI follow up





Please call with any questions or concerns. Thank you.





Attg add: I interviewed and examined pt, reviewed chart and labs.  Pt with 

possible microaspiration?  She has fully recovered, and she be ok for d/c 

today. Please call with questions

## 2018-03-13 NOTE — EDITING REQUIRED CODING QUERY
CODING QUERY



To promote full compliance with coding requirements relating to patient care, provider 
participation is requested in all cases of  uncertainty.  Please assist us with the 
question(s) below:



Coding Question(s):       



Patient 6 hrs s/p EGD admitted for sepsis d/t aspiration pneumonia. Please document the 
etiology of the postop aspiration pneumonia.  Thank  you. Jani Rizvi [facility] 



Physician's Response(s): 



Possible aspiration pneumonia while recovering from Anesthesia 











Principal Diagnosis: "_that condition established after study, to be chiefly responsible 
for occasioning the admission of the patient to the hospital for care."

Co-Existing Principal Diagnosis:  "_when two or more diagnoses equally meet the criteria 
for principal diagnosis as determined by the circumstances of admission, diagnostic work 
up, and/or therapy provided, and the Alphabetic Index, Tabular List, or another coding 
guideline does not provide sequencing direction, any one of the diagnoses may be sequenced 
first."

"When the physician has documented what appears to be a current diagnosis in the body of 
the record, but has not included the diagnosis in the final diagnostic statement, the 
physician should be asked whether the diagnosis should be added."

(Source Coding [facility] 2 QTR90. p3-4)